# Patient Record
Sex: FEMALE | Race: WHITE | Employment: UNEMPLOYED | URBAN - METROPOLITAN AREA
[De-identification: names, ages, dates, MRNs, and addresses within clinical notes are randomized per-mention and may not be internally consistent; named-entity substitution may affect disease eponyms.]

---
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
## 2020-01-17 ENCOUNTER — OFFICE VISIT (OUTPATIENT)
Dept: ORTHOPEDIC SURGERY | Age: 65
End: 2020-01-17

## 2020-01-17 VITALS
WEIGHT: 179 LBS | BODY MASS INDEX: 35.14 KG/M2 | OXYGEN SATURATION: 97 % | DIASTOLIC BLOOD PRESSURE: 78 MMHG | RESPIRATION RATE: 16 BRPM | HEART RATE: 67 BPM | SYSTOLIC BLOOD PRESSURE: 117 MMHG | HEIGHT: 60 IN

## 2020-01-17 DIAGNOSIS — M54.16 LUMBAR NEURITIS: ICD-10-CM

## 2020-01-17 DIAGNOSIS — M47.816 SPONDYLOSIS WITHOUT MYELOPATHY OR RADICULOPATHY, LUMBAR REGION: Primary | ICD-10-CM

## 2020-01-17 RX ORDER — IBUPROFEN 600 MG/1
TABLET ORAL
COMMUNITY
End: 2021-07-13

## 2020-01-17 RX ORDER — IBUPROFEN 800 MG/1
800 TABLET ORAL
Qty: 45 TAB | Refills: 0 | Status: SHIPPED | OUTPATIENT
Start: 2020-01-17 | End: 2020-07-09

## 2020-01-17 RX ORDER — METHYLPREDNISOLONE 4 MG/1
TABLET ORAL
Qty: 1 DOSE PACK | Refills: 0 | Status: SHIPPED | OUTPATIENT
Start: 2020-01-17 | End: 2021-12-03 | Stop reason: ALTCHOICE

## 2020-01-17 NOTE — LETTER
1/17/20    Patient: Darleen Denny   YOB: 1955   Date of Visit: 1/17/2020     Gloria Berrios NP  [address] [place] [address] [employer] [place]: [phone]       Dear Gloria Berrios NP,      Thank you for referring Ms. Mary Delvalle to [address] for evaluation. My notes for this consultation are attached. If you have questions, please do not hesitate to call me. I look forward to following your patient along with you.       Sincerely,    Serge Leavitt MD
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
## 2020-02-17 ENCOUNTER — OFFICE VISIT (OUTPATIENT)
Dept: ORTHOPEDIC SURGERY | Age: 65
End: 2020-02-17

## 2020-02-17 VITALS
BODY MASS INDEX: 35.22 KG/M2 | HEIGHT: 60 IN | HEART RATE: 71 BPM | SYSTOLIC BLOOD PRESSURE: 127 MMHG | RESPIRATION RATE: 16 BRPM | WEIGHT: 179.4 LBS | TEMPERATURE: 98.1 F | OXYGEN SATURATION: 100 % | DIASTOLIC BLOOD PRESSURE: 72 MMHG

## 2020-02-17 DIAGNOSIS — M54.16 LUMBAR NEURITIS: ICD-10-CM

## 2020-02-17 DIAGNOSIS — E66.01 SEVERE OBESITY (HCC): ICD-10-CM

## 2020-02-17 DIAGNOSIS — M47.816 SPONDYLOSIS WITHOUT MYELOPATHY OR RADICULOPATHY, LUMBAR REGION: Primary | ICD-10-CM

## 2020-02-17 RX ORDER — GABAPENTIN 300 MG/1
300 CAPSULE ORAL
Qty: 90 CAP | Refills: 1 | Status: SHIPPED | OUTPATIENT
Start: 2020-02-17 | End: 2021-12-03 | Stop reason: ALTCHOICE

## 2020-02-17 NOTE — LETTER
2/17/20    Patient: Jj Aguiar   YOB: 1955   Date of Visit: 2/17/2020     Melida Georges NP  [address] [place] [address] [employer] [place]: [phone]       Dear Melida Georges NP,      Thank you for referring Ms. Christian Rapp to [address] for evaluation. My notes for this consultation are attached. If you have questions, please do not hesitate to call me. I look forward to following your patient along with you.       Sincerely,    Bassem Viera MD

## 2020-04-13 ENCOUNTER — VIRTUAL VISIT (OUTPATIENT)
Dept: ORTHOPEDIC SURGERY | Age: 65
End: 2020-04-13

## 2020-04-13 DIAGNOSIS — M47.816 SPONDYLOSIS WITHOUT MYELOPATHY OR RADICULOPATHY, LUMBAR REGION: Primary | ICD-10-CM

## 2020-04-13 DIAGNOSIS — M54.16 LUMBAR NEURITIS: ICD-10-CM

## 2020-04-13 DIAGNOSIS — E66.01 SEVERE OBESITY (HCC): ICD-10-CM
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
## 2020-04-14 RX ORDER — DULOXETIN HYDROCHLORIDE 30 MG/1
30 CAPSULE, DELAYED RELEASE ORAL DAILY
Qty: 30 CAP | Refills: 1 | Status: SHIPPED | OUTPATIENT
Start: 2020-04-14 | End: 2021-12-03 | Stop reason: SDUPTHER

## 2020-05-12 ENCOUNTER — VIRTUAL VISIT (OUTPATIENT)
Dept: ORTHOPEDIC SURGERY | Age: 65
End: 2020-05-12

## 2020-05-12 DIAGNOSIS — E66.01 SEVERE OBESITY (HCC): Primary | ICD-10-CM

## 2020-05-12 DIAGNOSIS — M54.16 LUMBAR NEURITIS: ICD-10-CM

## 2020-05-12 DIAGNOSIS — M47.816 SPONDYLOSIS WITHOUT MYELOPATHY OR RADICULOPATHY, LUMBAR REGION: ICD-10-CM

## 2020-05-12 RX ORDER — DULOXETIN HYDROCHLORIDE 60 MG/1
60 CAPSULE, DELAYED RELEASE ORAL DAILY
Qty: 30 CAP | Refills: 1 | Status: SHIPPED | OUTPATIENT
Start: 2020-05-12 | End: 2022-03-25 | Stop reason: ALTCHOICE
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
## 2020-06-16 ENCOUNTER — OFFICE VISIT (OUTPATIENT)
Dept: ORTHOPEDIC SURGERY | Age: 65
End: 2020-06-16

## 2020-06-16 VITALS
TEMPERATURE: 98.4 F | DIASTOLIC BLOOD PRESSURE: 79 MMHG | OXYGEN SATURATION: 98 % | HEART RATE: 63 BPM | WEIGHT: 176.8 LBS | SYSTOLIC BLOOD PRESSURE: 134 MMHG | BODY MASS INDEX: 34.71 KG/M2 | HEIGHT: 60 IN

## 2020-06-16 DIAGNOSIS — M47.817 LUMBOSACRAL SPONDYLOSIS WITHOUT MYELOPATHY: Primary | ICD-10-CM

## 2020-06-16 DIAGNOSIS — M54.16 LUMBAR NEURITIS: ICD-10-CM

## 2020-06-16 DIAGNOSIS — M47.819 FACET ARTHROPATHY: ICD-10-CM

## 2020-06-16 RX ORDER — PREGABALIN 50 MG/1
50 CAPSULE ORAL 2 TIMES DAILY
Qty: 60 CAP | Refills: 1 | Status: SHIPPED | OUTPATIENT
Start: 2020-06-16 | End: 2021-12-03 | Stop reason: ALTCHOICE

## 2020-06-16 RX ORDER — DULOXETIN HYDROCHLORIDE 60 MG/1
60 CAPSULE, DELAYED RELEASE ORAL DAILY
Qty: 90 CAP | Refills: 1 | Status: SHIPPED | OUTPATIENT
Start: 2020-06-16 | End: 2021-07-13

## 2020-06-16 NOTE — LETTER
6/16/20    Patient: Arely De Oliveira   YOB: 1955   Date of Visit: 6/16/2020     Anthony Agustin NP  [address] [place] [address] Referral.[facility] [place]: [phone]    Dear Anthony Agustin NP,      Thank you for referring Ms. Debra Babin to [address] for evaluation. My notes for this consultation are attached. If you have questions, please do not hesitate to call me. I look forward to following your patient along with you.       Sincerely,    Kyle Inman MD

## 2020-07-09 DIAGNOSIS — M47.816 SPONDYLOSIS WITHOUT MYELOPATHY OR RADICULOPATHY, LUMBAR REGION: ICD-10-CM

## 2020-07-09 DIAGNOSIS — M54.16 LUMBAR NEURITIS: ICD-10-CM

## 2020-07-09 RX ORDER — IBUPROFEN 800 MG/1
TABLET ORAL
Qty: 45 TAB | Refills: 0 | Status: SHIPPED | OUTPATIENT
Start: 2020-07-09 | End: 2021-07-13
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
## 2020-07-22 ENCOUNTER — VIRTUAL VISIT (OUTPATIENT)
Dept: ORTHOPEDIC SURGERY | Age: 65
End: 2020-07-22

## 2020-07-22 DIAGNOSIS — M54.16 LUMBAR NEURITIS: ICD-10-CM

## 2020-07-22 DIAGNOSIS — M47.819 FACET ARTHROPATHY: ICD-10-CM

## 2020-07-22 DIAGNOSIS — M47.817 LUMBOSACRAL SPONDYLOSIS WITHOUT MYELOPATHY: Primary | ICD-10-CM

## 2020-07-22 RX ORDER — DULOXETIN HYDROCHLORIDE 30 MG/1
30 CAPSULE, DELAYED RELEASE ORAL DAILY
Qty: 30 CAP | Refills: 1 | Status: SHIPPED | OUTPATIENT
Start: 2020-07-22 | End: 2021-07-13

## 2020-07-22 RX ORDER — IBUPROFEN 800 MG/1
800 TABLET ORAL
COMMUNITY
End: 2021-12-03 | Stop reason: ALTCHOICE

## 2020-07-22 RX ORDER — PENICILLIN V POTASSIUM 500 MG/1
TABLET, FILM COATED ORAL
COMMUNITY
Start: 2020-07-09 | End: 2021-12-03 | Stop reason: ALTCHOICE

## 2020-09-04 ENCOUNTER — OFFICE VISIT (OUTPATIENT)
Dept: NEUROLOGY | Age: 65
End: 2020-09-04

## 2020-09-04 VITALS
DIASTOLIC BLOOD PRESSURE: 82 MMHG | WEIGHT: 174.4 LBS | BODY MASS INDEX: 34.24 KG/M2 | SYSTOLIC BLOOD PRESSURE: 136 MMHG | RESPIRATION RATE: 18 BRPM | HEART RATE: 68 BPM | OXYGEN SATURATION: 98 % | HEIGHT: 60 IN

## 2020-09-04 DIAGNOSIS — G50.0 RIGHT TRIGEMINAL NEURALGIA: Primary | ICD-10-CM

## 2020-09-04 RX ORDER — CEPHALEXIN 500 MG/1
CAPSULE ORAL
COMMUNITY
Start: 2020-08-19 | End: 2021-07-13

## 2020-09-04 RX ORDER — CARBAMAZEPINE 100 MG/1
100 TABLET, EXTENDED RELEASE ORAL 2 TIMES DAILY
Qty: 60 TAB | Refills: 2 | Status: SHIPPED | OUTPATIENT
Start: 2020-09-04 | End: 2020-10-04

## 2020-09-04 NOTE — LETTER
9/4/20 Patient: Talia Chu YOB: 1955 Date of Visit: 9/4/2020 Blanca Molina NP 
[address] [place] [address] VIA Facsimile: [phone] Dear Blanca Molina NP, Thank you for referring Ms. Chelsea Hameed to [facility] for evaluation. My notes for this consultation are attached. If you have questions, please do not hesitate to call me. I look forward to following your patient along with you. Sincerely, Thai Moreland MD

## 2020-09-04 NOTE — PATIENT INSTRUCTIONS
Carbamazepine (By mouth) Carbamazepine (Aria Nate) Treats seizures, nerve pain, or bipolar disorder. Brand Name(s): Carbatrol, Epitol, Equetro, TEGretol, TEGretol-XR There may be other brand names for this medicine. When This Medicine Should Not Be Used: This medicine is not right for everyone. Do not use it if you had an allergic reaction to carbamazepine or a tricyclic antidepressant or if you are pregnant. How to Use This Medicine:  
Long Acting Capsule, Liquid, Tablet, Chewable Tablet, Long Acting Tablet · Take your medicine as directed. Your dose may need to be changed several times to find what works best for you. This medicine may be used alone or together with other seizure medicines. · Extended-release capsule: ¨ Swallow the capsule whole. Do not break, crush, or chew it. ¨ You may open the capsule and pour the medicine into a small amount of soft food such as pudding, yogurt, or applesauce. Stir this mixture well and swallow it without chewing. · Chewable tablet:  
¨ It is best to take this medicine with food or milk. ¨ Chew the tablet well before swallowing it. · Liquid:  
¨ It is best to take this medicine with food or milk. ¨ Measure the oral liquid medicine with a marked measuring spoon, oral syringe, or medicine cup. Shake the bottle well just before each use. ¨ Do not take this medicine at the same time as other oral liquid medicines. · Tablet:  
¨ It is best to take this medicine with food or milk. ¨ Swallow the tablet whole. Do not crush, break, or chew it. ¨ Do not use an extended-release tablet that is cracked or chipped. · This medicine should come with a Medication Guide. Ask your pharmacist for a copy if you do not have one. · Missed dose: Take a dose as soon as you remember. If it is almost time for your next dose, wait until then and take a regular dose. Do not take extra medicine to make up for a missed dose. · Store the medicine in a closed container at room temperature, away from heat, moisture, and direct light. Drugs and Foods to Avoid: Ask your doctor or pharmacist before using any other medicine, including over-the-counter medicines, vitamins, and herbal products. · Do not use this medicine together with nefazodone, delavirdine, or certain other medicines for HIV or AIDS (including efavirenz, etravirine). Do not use this medicine and an MAO inhibitor (MAOI) within 14 days of each other. · The list below includes some of the most commonly used medicines that can interact with carbamazepine. There are many other drugs not listed. Make sure your doctor knows the names of all the medicines you use. Tell your doctor if you are using any of the following: ¨ Alprazolam, aprepitant, aripiprazole, buspirone, chloroquine, citalopram, clarithromycin, clomipramine, clonazepam, clozapine, cyclophosphamide, cyclosporine, diltiazem, doxorubicin, erythromycin, everolimus, felodipine, fluoxetine, imatinib, isoniazid, lamotrigine, lapatinib, lithium, loxapine, mefloquine, methadone, phenytoin, praziquantel, primidone, propoxyphene, quetiapine, quinine, sirolimus, tacrolimus, temsirolimus, tramadol, trazodone, triazolam, valproate, verapamil, or zileuton ¨ HIV protease inhibitor (including atazanavir, darunavir, fosamprenavir, indinavir, lopinavir, ritonavir, saquinavir) ¨ Medicine to treat fungal infection (including fluconazole, itraconazole, ketoconazole, voriconazole) ¨ Steroid (including dexamethasone, prednisolone, prednisone) · Birth control pills, shots, and other hormonal birth control methods may not work as well while you use this medicine. You may want to use a second form of birth control. · Do not eat grapefruit or drink grapefruit juice while you are using this medicine. · Tell your doctor if you use anything else that makes you sleepy.  Some examples are allergy medicine, narcotic pain medicine, and alcohol. Warnings While Using This Medicine: · It is not safe to take this medicine during pregnancy. It could harm an unborn baby. Tell your doctor right away if you become pregnant. · Tell your doctor if you are breastfeeding, or if you have kidney disease, liver disease, glaucoma, heart disease, heart rhythm problems, porphyria, an intolerance to fructose, or a history of bone marrow depression, suicidal thoughts, or depression. Tell your doctor if you had an allergic reaction to any other medicine (especially seizure medicines). · Tell your doctor if you have  ancestry. Your doctor may test you for serious skin reactions before prescribing this medicine. · This medicine may cause the following problems: 
¨ Serious skin reactions ¨ Aplastic anemia or other blood problems ¨ Drug reaction with eosinophilia and systemic symptoms (DRESS), which may damage organs such as the liver, kidney, or heart ¨ Suicidal thoughts or behavior ¨ Low sodium levels in the blood ¨ Liver damage · Do not stop using this medicine suddenly. Your doctor will need to slowly decrease your dose before you stop it completely. · Tell any doctor or dentist who treats you that you are using this medicine. This medicine may affect certain medical test results. · This medicine may make you dizzy or drowsy. Do not drive or do anything else that could be dangerous until you know how this medicine affects you. · Your doctor will do lab tests at regular visits to check on the effects of this medicine. Keep all appointments. Your doctor may want to have your eyes checked by an eye doctor. · Keep all medicine out of the reach of children. Never share your medicine with anyone. Possible Side Effects While Using This Medicine:  
Call your doctor right away if you notice any of these side effects: · Allergic reaction: Itching or hives, swelling in your face or hands, swelling or tingling in your mouth or throat, chest tightness, trouble breathing · Blistering, peeling, red skin rash · Blurred vision, changes in vision · Change in how much or how often you urinate · Chest pain, trouble breathing, cold sweats, bluish skin · Confusion, memory problems, unusual tiredness, muscle spasms or weakness · Dark urine or pale stools, nausea, vomiting, loss of appetite, stomach pain, yellow skin or eyes · Fast, slow, pounding, or uneven heartbeat · Fever, chills, cough, sore throat, or sores in your mouth · Lightheadedness or fainting · Swollen, painful, or tender lymph glands in your neck, armpit, or groin · Thoughts of hurting yourself or others, unusual thoughts or behavior · Unusual bleeding, bruising, or weakness If you notice these less serious side effects, talk with your doctor: · Anxiety, agitation, depression, restlessness, or trouble sleeping · Dizziness or drowsiness · Dry mouth · Mild nausea, vomiting, constipation If you notice other side effects that you think are caused by this medicine, tell your doctor. Call your doctor for medical advice about side effects. You may report side effects to FDA at 3-068-IDG-3445 © 2017 [facility] Information is for End User's use only and may not be sold, redistributed or otherwise used for commercial purposes. The above information is an  only. It is not intended as medical advice for individual conditions or treatments. Talk to your doctor, nurse or pharmacist before following any medical regimen to see if it is safe and effective for you. Trigeminal Neuralgia: Care Instructions Your Care Instructions Trigeminal neuralgia is a problem with the large nerve that brings feeling to your face. It causes a sudden, sharp pain on one side of your face.  Just touching your cheek or talking can set off shooting pain toward the ear, eye, or nostril. Living with this pain can be very hard. Some people have long periods when they do not have pain, and then it comes back. Some people have periods of pain often. But medicine or other treatment often can make the pain go away. If you keep having pain, surgery may help. This problem is also called tic douloureux (say \"daxa cadenao-casey-GINA\"). Follow-up care is a key part of your treatment and safety. Be sure to make and go to all appointments, and call your doctor if you are having problems. It's also a good idea to know your test results and keep a list of the medicines you take. How can you care for yourself at home? · Write down when you have pain and what you were doing when it started. Try to find what causes the pain. Being in a cold wind, yawning, or shaving are examples. Avoid or limit these triggers if you can. · Be safe with medicines. Take your medicines exactly as prescribed. ? Your doctor may have prescribed medicines used to treat depression and seizures. They can reduce your pain, help you sleep better, and improve your mood. ? Call your doctor if you think you are having a problem with your medicine. You will get more details on the specific medicines your doctor prescribes. ? If you are not taking a prescription pain medicine, take an over-the-counter medicine such as acetaminophen (Tylenol), ibuprofen (Advil, Motrin), or naproxen (Aleve). Read and follow all instructions on the label. ? Do not take two or more pain medicines at the same time unless the doctor told you to. Many pain medicines have acetaminophen, which is Tylenol. Too much acetaminophen (Tylenol) can be harmful. · Reduce stress in your life. Ask your doctor about ways to relax. These may include breathing exercises and massage. · Think about joining a support group with other people who have this problem.  These groups can give comfort and information about what to do to feel better. Your doctor can tell you how to find a support group. When should you call for help? Call your doctor now or seek immediate medical care if: 
  · You have severe pain that you can't control. Watch closely for changes in your health, and be sure to contact your doctor if: 
  · You are not able to sleep because of the pain.  
  · You do not get better as expected. Where can you learn more? Go to http://tyler-socorro.info/ Enter R378 in the search box to learn more about \"Trigeminal Neuralgia: Care Instructions. \" Current as of: June 26, 2019               Content Version: 12.6 © 6651-6612 [employer]. Care instructions adapted under license by [employer] (which disclaims liability or warranty for this information). If you have questions about a medical condition or this instruction, always ask your healthcare professional. [address] any warranty or liability for your use of this information.

## 2020-09-04 NOTE — PROGRESS NOTES
Paulette Sacks is a 76 y.o. female . [facility] and Facial Pain   . A 76years old female patient with medical history of chronic low back pain here for evaluation of right lower facial pain of about 2 months duration. She describes the pain as intermittent sharp shooting pain lasting for about 5 minutes. It is triggered by touching the lower part of the chest, brushing her teeth, eating or drinking. Pain is severe. She was seen by her dentist and had x-rays and her exam was normal.  She was given antibiotics. No change in the intensity of the pain. She takes over-the-counter pain medications with no relief. She has attacks almost every day. She does not have any other site of similar pain. No facial numbness or droop. No rash. No change in her speech. No numbness over her arms or legs. No recent trauma      Review of Systems   Constitutional: Negative for chills, fever, malaise/fatigue and weight loss. HENT: Positive for tinnitus. Negative for hearing loss. Eyes: Negative for blurred vision and double vision. Respiratory: Negative for cough and shortness of breath. Cardiovascular: Negative for chest pain and leg swelling. Gastrointestinal: Negative for heartburn, nausea and vomiting. Genitourinary: Negative for dysuria, frequency and urgency. Musculoskeletal: Positive for back pain (radiates to the LEs). Negative for myalgias and neck pain. Skin: Negative for itching and rash. Neurological: Negative for dizziness, tingling, tremors, sensory change, speech change, weakness and headaches. Endo/Heme/Allergies: Does not bruise/bleed easily. Psychiatric/Behavioral: Negative for depression. The patient does not have insomnia. No past medical history on file. Past Surgical History:   Procedure Laterality Date    HX CYST REMOVAL      cyst removal on breast    HX HYSTERECTOMY      HX TONSILLECTOMY          No family history on file. Social History     Socioeconomic History    Marital status: SINGLE     Spouse name: Not on file    Number of children: Not on file    Years of education: Not on file    Highest education level: Not on file   Occupational History    Not on file   Social Needs    Financial resource strain: Not on file    Food insecurity     Worry: Not on file     Inability: Not on file    Transportation needs     Medical: Not on file     Non-medical: Not on file   Tobacco Use    Smoking status: Never Smoker    Smokeless tobacco: Never Used   Substance and Sexual Activity    Alcohol use: Not Currently    Drug use: Not on file    Sexual activity: Not on file   Lifestyle    Physical activity     Days per week: Not on file     Minutes per session: Not on file    Stress: Not on file   Relationships    Social connections     Talks on phone: Not on file     Gets together: Not on file     Attends Orthodoxy service: Not on file     Active member of club or organization: Not on file     Attends meetings of clubs or organizations: Not on file     Relationship status: Not on file    Intimate partner violence     Fear of current or ex partner: Not on file     Emotionally abused: Not on file     Physically abused: Not on file     Forced sexual activity: Not on file   Other Topics Concern    Not on file   Social History Narrative    Not on file        No Known Allergies      Current Outpatient Medications   Medication Sig Dispense Refill    DULoxetine (CYMBALTA) 30 mg capsule Take 1 Cap by mouth daily. Indications: neuropathic pain 30 Cap 1    cephALEXin (KEFLEX) 500 mg capsule TAKE 1 CAPSULE BY MOUTH THREE TIMES DAILY      penicillin v potassium (VEETID) 500 mg tablet TAKE 1 TABLET BY MOUTH 4 TIMES DAILY FOR INFECTION      ibuprofen (MOTRIN) 800 mg tablet Take 800 mg by mouth.  DULoxetine (CYMBALTA) 30 mg capsule Take 1 Cap by mouth daily.  30 Cap 1    ibuprofen (MOTRIN) 800 mg tablet TAKE 1 TABLET BY MOUTH THREE TIMES DAILY WITH MEALS 45 Tab 0    DULoxetine (CYMBALTA) 60 mg capsule Take 1 Cap by mouth daily. 90 Cap 1    pregabalin (LYRICA) 50 mg capsule Take 1 Cap by mouth two (2) times a day. Max Daily Amount: 100 mg. 60 Cap 1    DULoxetine (CYMBALTA) 60 mg capsule Take 1 Cap by mouth daily. 30 Cap 1    gabapentin (NEURONTIN) 300 mg capsule Take 1 Cap by mouth three (3) times daily (with meals). Max Daily Amount: 900 mg. Indications: neuropathic pain 90 Cap 1    ibuprofen (MOTRIN) 600 mg tablet Take  by mouth every six (6) hours as needed for Pain.  methylPREDNISolone (MEDROL DOSEPACK) 4 mg tablet Per dose pack instructions 1 Dose Pack 0         Physical Exam  Constitutional:       Appearance: Normal appearance. HENT:      Head: Normocephalic and atraumatic. Right Ear: Tympanic membrane normal.      Left Ear: Tympanic membrane normal.      Mouth/Throat:      Mouth: Mucous membranes are moist.      Pharynx: Oropharynx is clear. No oropharyngeal exudate. Eyes:      Extraocular Movements: Extraocular movements intact. Pupils: Pupils are equal, round, and reactive to light. Neck:      Musculoskeletal: Normal range of motion and neck supple. Pulmonary:      Effort: Pulmonary effort is normal. No respiratory distress. Musculoskeletal: Normal range of motion. Right lower leg: No edema. Left lower leg: No edema. Neurological:      Mental Status: She is alert. Comments: Mental status: Awake, alert, oriented x3, follows simple and complex commands. Speech and languge: fluent, coherent, naming and repitition intact, reading and comprehension intact  CN: VFF, EOMI, PERRLA, face sensation intact , no facial asymmetry noted, palate elevation symmetric bilat, SS+SCM 5/5 bilat, tongue midline  Motor: no pronator drift, tone normal throughout, strength 5/5 throughout  Sensory: intact to light touch throughout  Coordination: FNF, HS accurate w/o dysmetria  DTR: 2+ throughout.   Gait: normal. No visits with results within 3 Month(s) from this visit. Latest known visit with results is:   No results found for any previous visit. ICD-10-CM ICD-9-CM    1. Right trigeminal neuralgia  G50.0 350.1 MRI BRAIN W WO CONT      MRA BRAIN WO CONT      carBAMazepine XR (TEGretol XR) 100 mg SR tablet     A 71years old female patient here for evaluation of sharp shooting pain over the lower right face; mainly involving the mandibular division of the trigeminal nerve. No swelling. No discharge from her gum. Dental evaluation is normal and currently takes antibiotic. No obvious neuro findings. The pain is most likely from trigeminal neuralgia. Will get MRI of her brain with and without contrast rule out by possible structural cause. We will also get MRA of the head to look for vascular loop. We will start her on carbamazepine 100 mg p.o. twice daily; will gradually increase the dose. Discussed side effects of other medication. We will see her again in 3 months time but will call her with results of the MRI.

## 2020-09-04 NOTE — PROGRESS NOTES
Phani Mcarthur is a 76 y.o. female new patient in office today to discuss facial pain; as referred by Mikel Coyle NP.  Patient has c/o

## 2020-09-11 ENCOUNTER — TELEPHONE (OUTPATIENT)
Dept: NEUROLOGY | Age: 65
End: 2020-09-11

## 2020-09-11 NOTE — TELEPHONE ENCOUNTER
Pt states that Carbamazepine is not working and requests a new medication for the pain. Please advise.

## 2020-09-23 ENCOUNTER — HOSPITAL ENCOUNTER (OUTPATIENT)
Age: 65
Discharge: HOME | End: 2020-09-23
Attending: STUDENT IN AN ORGANIZED HEALTH CARE EDUCATION/TRAINING PROGRAM
Payer: MEDICARE

## 2020-09-23 DIAGNOSIS — G50.0 RIGHT TRIGEMINAL NEURALGIA: ICD-10-CM

## 2020-09-23 LAB — CREAT UR-MCNC: 0.7 MG/DL (ref 0.6–1.3)

## 2020-09-23 PROCEDURE — 70544 MR ANGIOGRAPHY HEAD W/O DYE: CPT

## 2020-09-23 PROCEDURE — A9575 INJ GADOTERATE MEGLUMI 0.1ML: HCPCS | Performed by: STUDENT IN AN ORGANIZED HEALTH CARE EDUCATION/TRAINING PROGRAM

## 2020-09-23 PROCEDURE — 82565 ASSAY OF CREATININE: CPT

## 2020-09-23 PROCEDURE — 70553 MRI BRAIN STEM W/O & W/DYE: CPT

## 2020-09-23 PROCEDURE — 74011636320 HC RX REV CODE- 636/320: Performed by: STUDENT IN AN ORGANIZED HEALTH CARE EDUCATION/TRAINING PROGRAM

## 2020-09-23 RX ADMIN — GADOTERATE MEGLUMINE 17 ML: 376.9 INJECTION INTRAVENOUS at 15:00

## 2020-09-26 DIAGNOSIS — G50.0 RIGHT TRIGEMINAL NEURALGIA: Primary | ICD-10-CM

## 2020-09-26 NOTE — PROGRESS NOTES
Please let her know that the MRA of the head has shown the trigeminal nerve is in contact with a lopping artery which can cause the trigeminal neuralgia. The brain otherwise looks normal.  I have put referral to see neurosurgery for possible microvascular decompressive surgery.

## 2020-09-28 NOTE — PROGRESS NOTES
Spoke with patient, made aware of provider's comments, recommendations and referral to neurosurgery.

## 2020-10-07 ENCOUNTER — TELEPHONE (OUTPATIENT)
Dept: NEUROLOGY | Age: 65
End: 2020-10-07

## 2020-10-07 NOTE — TELEPHONE ENCOUNTER
Pt states that medication she was given for nerve pain is not helping. She requests stronger medication. Please advise.

## 2020-12-04 ENCOUNTER — VIRTUAL VISIT (OUTPATIENT)
Dept: NEUROLOGY | Age: 65
End: 2020-12-04
Payer: COMMERCIAL

## 2020-12-04 DIAGNOSIS — G50.0 RIGHT TRIGEMINAL NEURALGIA: Primary | ICD-10-CM

## 2020-12-04 PROCEDURE — 99214 OFFICE O/P EST MOD 30 MIN: CPT | Performed by: STUDENT IN AN ORGANIZED HEALTH CARE EDUCATION/TRAINING PROGRAM

## 2020-12-04 RX ORDER — CARBAMAZEPINE 200 MG/1
200 TABLET ORAL 3 TIMES DAILY
Qty: 90 TAB | Refills: 3 | Status: SHIPPED | OUTPATIENT
Start: 2020-12-04 | End: 2021-01-03

## 2020-12-04 NOTE — PROGRESS NOTES
Joana Flores is a 76 y.o. female on virtual visit today for follow-up on right trigeminal neuralgia. 1. Have you been to the ER, urgent care clinic since your last visit? Hospitalized since your last visit? No    2. Have you seen or consulted any other health care providers outside of the [address] [place] [address] since your last visit? Include any pap smears or colon screening. No    Mobile number [phone] will be used for today's visit.

## 2020-12-04 NOTE — PROGRESS NOTES
Lenora Quiros is a 76 y.o. female who was seen by synchronous (real-time) audio-video technology on 12/4/2020 for Facial Pain (right trigeminal neuralgia)        Assessment & Plan:   Diagnoses and all orders for this visit:    1. Right trigeminal neuralgia  -     carBAMazepine (TEGretol) 200 mg tablet; Take 1 Tab by mouth three (3) times daily for 30 days. A 76years old female patient here for follow-up of trigeminal neuralgia. Patient continues to have severe pain [possible questionable compliance to her medication and asking for more stronger pain medications; claims she does not want to take any medication which has side effects like sedation]. Since the pain is getting worse despite the 200 mg twice daily carbamazepine, I will increase the dose to 200 mg p.o. 3 times daily. She will follow with neurosurgery for the microvascular decompression. Will check hercarbamazepine level,  CBC and BMP. Will see her in 3 months. Subjective:   A 58-year-old female patient here for follow-up of right trigeminal neuralgia mainly in the maxillary and mandibular divisions. This is a virtual/video encounter. Her last visit was in September 2020. She was started on carbamazepine 100 mg p.o. twice daily. Subsequently the dose of gabapentin was increased to 200 mg p.o. twice daily. In addition was also started on gabapentin and claims she is taking 300 mg twice a day. Patient is complaining of severe pain and cramps none of her medications are working. During the last visit, MRI and MRA of the brain were ordered. 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 She was referred to [facility] neurosurgery []. Repeat MRI of the brain and internal auditory canal was done on November 30, 2020.   It showed contact of the  right trigeminal nerve by a descending vascular loop, the origin of which is not well established; the IAC appears normal.  Trying to schedule her for surgery [microvascular decompression]. Prior to Admission medications    Medication Sig Start Date End Date Taking? Authorizing Provider   carBAMazepine (TEGretol) 200 mg tablet Take 1 Tab by mouth three (3) times daily for 30 days. 12/4/20 1/3/21 Yes Evelyn PATINO MD   ibuprofen (MOTRIN) 800 mg tablet TAKE 1 TABLET BY MOUTH THREE TIMES DAILY WITH MEALS 7/9/20  Yes Karen Bell NP   gabapentin (NEURONTIN) 300 mg capsule Take 1 Cap by mouth three (3) times daily (with meals). Max Daily Amount: 900 mg. Indications: neuropathic pain 2/17/20  Yes Dylon Dinh MD   ibuprofen (MOTRIN) 600 mg tablet Take  by mouth every six (6) hours as needed for Pain. Yes Provider, Historical   cephALEXin (KEFLEX) 500 mg capsule TAKE 1 CAPSULE BY MOUTH THREE TIMES DAILY 8/19/20   Provider, Historical   penicillin v potassium (VEETID) 500 mg tablet TAKE 1 TABLET BY MOUTH 4 TIMES DAILY FOR INFECTION 7/9/20   Provider, Historical   ibuprofen (MOTRIN) 800 mg tablet Take 800 mg by mouth. Provider, Historical   DULoxetine (CYMBALTA) 30 mg capsule Take 1 Cap by mouth daily. 7/22/20   Ankur Wallace MD   DULoxetine (CYMBALTA) 60 mg capsule Take 1 Cap by mouth daily. 6/16/20   Dylon Dinh MD   pregabalin (LYRICA) 50 mg capsule Take 1 Cap by mouth two (2) times a day. Max Daily Amount: 100 mg. 6/16/20   Ankur Wallace MD   DULoxetine (CYMBALTA) 60 mg capsule Take 1 Cap by mouth daily. 5/12/20   Ankur Wallace MD   DULoxetine (CYMBALTA) 30 mg capsule Take 1 Cap by mouth daily.  Indications: neuropathic pain 4/14/20   Dylon Dinh MD   methylPREDNISolone (MEDROL DOSEPACK) 4 mg tablet Per dose pack instructions 1/17/20   Dylon Dinh MD     Patient Active Problem List   Diagnosis Code    Severe obesity ([facility] E66.01     Patient Active Problem List    Diagnosis Date Noted    Severe obesity ([facility] 02/17/2020     Current Outpatient Medications   Medication Sig Dispense Refill    carBAMazepine (TEGretol) 200 mg tablet Take 1 Tab by mouth three (3) times daily for 30 days. 90 Tab 3    ibuprofen (MOTRIN) 800 mg tablet TAKE 1 TABLET BY MOUTH THREE TIMES DAILY WITH MEALS 45 Tab 0    gabapentin (NEURONTIN) 300 mg capsule Take 1 Cap by mouth three (3) times daily (with meals). Max Daily Amount: 900 mg. Indications: neuropathic pain 90 Cap 1    ibuprofen (MOTRIN) 600 mg tablet Take  by mouth every six (6) hours as needed for Pain.  cephALEXin (KEFLEX) 500 mg capsule TAKE 1 CAPSULE BY MOUTH THREE TIMES DAILY      penicillin v potassium (VEETID) 500 mg tablet TAKE 1 TABLET BY MOUTH 4 TIMES DAILY FOR INFECTION      ibuprofen (MOTRIN) 800 mg tablet Take 800 mg by mouth.  DULoxetine (CYMBALTA) 30 mg capsule Take 1 Cap by mouth daily. 30 Cap 1    DULoxetine (CYMBALTA) 60 mg capsule Take 1 Cap by mouth daily. 90 Cap 1    pregabalin (LYRICA) 50 mg capsule Take 1 Cap by mouth two (2) times a day. Max Daily Amount: 100 mg. 60 Cap 1    DULoxetine (CYMBALTA) 60 mg capsule Take 1 Cap by mouth daily. 30 Cap 1    DULoxetine (CYMBALTA) 30 mg capsule Take 1 Cap by mouth daily. Indications: neuropathic pain 30 Cap 1    methylPREDNISolone (MEDROL DOSEPACK) 4 mg tablet Per dose pack instructions 1 Dose Pack 0     No Known Allergies  No past medical history on file. Past Surgical History:   Procedure Laterality Date    HX CYST REMOVAL      cyst removal on breast    HX HYSTERECTOMY      HX TONSILLECTOMY       No family history on file. Social History     Tobacco Use    Smoking status: Never Smoker    Smokeless tobacco: Never Used   Substance Use Topics    Alcohol use: Not Currently           Objective:   No flowsheet data found.      [INSTRUCTIONS:  \"[x]\" Indicates a positive item  \"[]\" Indicates a negative item  -- DELETE ALL ITEMS NOT EXAMINED]    Constitutional: [x] Appears well-developed and well-nourished [x] No apparent distress      [] Abnormal -     Mental status: [x] Alert and awake  [x] Oriented to person/place/time [x] Able to follow commands    [] Abnormal -     Eyes:   EOM    [x]  Normal    [] Abnormal -   Sclera  []  Normal    [] Abnormal -          Discharge []  None visible   [] Abnormal -     HENT: [x] Normocephalic, atraumatic  [] Abnormal -   [x] Mouth/Throat: Mucous membranes are moist    External Ears [] Normal  [] Abnormal -    Neck: [x] No visualized mass [] Abnormal -     Pulmonary/Chest: [] Respiratory effort normal   [x] No visualized signs of difficulty breathing or respiratory distress        [] Abnormal -      Musculoskeletal:   [x] Normal gait with no signs of ataxia         [x] Normal range of motion of neck        [] Abnormal -     Neurological:        [x] No Facial Asymmetry (Cranial nerve 7 motor function) (limited exam due to video visit)          [x] No gaze palsy        [] Abnormal -          Skin:        [] No significant exanthematous lesions or discoloration noted on facial skin         [] Abnormal -            Psychiatric:       [x] Normal Affect [] Abnormal -        [] No Hallucinations    Other pertinent observable physical exam findings:-        We discussed the expected course, resolution and complications of the diagnosis(es) in detail. Medication risks, benefits, costs, interactions, and alternatives were discussed as indicated. I advised her to contact the office if her condition worsens, changes or fails to improve as anticipated. She expressed understanding with the diagnosis(es) and plan. Lito Arnold, who was evaluated through a patient-initiated, synchronous (real-time) audio-video encounter, and/or her healthcare decision maker, is aware that it is a billable service, with coverage as determined by her insurance carrier. She provided verbal consent to proceed: Yes, and patient identification was verified.  It was conducted pursuant to the emergency declaration under the [address] [facility] waiver authority and the [employer] Act. A caregiver was present when appropriate. Ability to conduct physical exam was limited. I was in the office. The patient was work place.       Jordy Hernandez MD

## 2021-03-18 ENCOUNTER — VIRTUAL VISIT (OUTPATIENT)
Dept: NEUROLOGY | Age: 66
End: 2021-03-18
Payer: COMMERCIAL

## 2021-03-18 DIAGNOSIS — G50.0 RIGHT TRIGEMINAL NEURALGIA: Primary | ICD-10-CM

## 2021-03-18 PROCEDURE — 99213 OFFICE O/P EST LOW 20 MIN: CPT | Performed by: STUDENT IN AN ORGANIZED HEALTH CARE EDUCATION/TRAINING PROGRAM

## 2021-03-18 NOTE — PROGRESS NOTES
Elissa Gordon is a 66 y.o. female who was seen by synchronous (real-time) audio-video technology on 3/18/2021 for Follow-up (Right trigeminal neuralgia )        Assessment & Plan:   Diagnoses and all orders for this visit:    1. Right trigeminal neuralgia    A 65 years old female patient here for follow-up of trigeminal neuralgia.  Was on carbamazepine during the last visit 200 mg p.o. 3 times daily.  She did not have any pain for the past 2 months.  Not currently taking the carbamazepine.  Following with neurosurgery for possible microvascular decompression surgery.  Since he does not have any pain, will continue observing her off medication.  We will see her in the clinic in 6 months time.      Subjective:   A 65-year-old female patient here for follow-up of right trigeminal neuralgia.  She  was last seen over a virtual encounter on December 4, 2020.  Was on carbamazepine 200 mg p.o. 3 times daily.  She claims, the facial pain subsided completely and did not have any pain for the past 2 months.  She has stopped taking the carbamazepine.  Is following with neurosurgery at [facility].  Denied any difficulty chewing.      Prior to Admission medications    Medication Sig Start Date End Date Taking? Authorizing Provider   cephALEXin (KEFLEX) 500 mg capsule TAKE 1 CAPSULE BY MOUTH THREE TIMES DAILY 8/19/20  Yes Provider, Historical   ibuprofen (MOTRIN) 800 mg tablet Take 800 mg by mouth.   Yes Provider, Historical   DULoxetine (CYMBALTA) 30 mg capsule Take 1 Cap by mouth daily. 7/22/20  Yes Ankur Wallace MD   ibuprofen (MOTRIN) 800 mg tablet TAKE 1 TABLET BY MOUTH THREE TIMES DAILY WITH MEALS 7/9/20  Yes Karen Bell NP   DULoxetine (CYMBALTA) 60 mg capsule Take 1 Cap by mouth daily. 6/16/20  Yes Ankur Wallace MD   pregabalin (LYRICA) 50 mg capsule Take 1 Cap by mouth two (2) times a day. Max Daily Amount: 100 mg. 6/16/20  Yes Ankur Wallace MD   DULoxetine (CYMBALTA) 60 mg capsule Take 1 Cap  by mouth daily. 5/12/20  Yes Logan Seymour MD   DULoxetine (CYMBALTA) 30 mg capsule Take 1 Cap by mouth daily. Indications: neuropathic pain 4/14/20  Yes Logan Seymour MD   gabapentin (NEURONTIN) 300 mg capsule Take 1 Cap by mouth three (3) times daily (with meals). Max Daily Amount: 900 mg. Indications: neuropathic pain 2/17/20  Yes Logan Seymour MD   ibuprofen (MOTRIN) 600 mg tablet Take  by mouth every six (6) hours as needed for Pain. Yes Provider, Historical   methylPREDNISolone (MEDROL DOSEPACK) 4 mg tablet Per dose pack instructions 1/17/20  Yes Logan Seymour MD   penicillin v potassium (VEETID) 500 mg tablet TAKE 1 TABLET BY MOUTH 4 TIMES DAILY FOR INFECTION 7/9/20   Provider, Historical     Patient Active Problem List   Diagnosis Code    [facility]     Patient Active Problem List    Diagnosis Date Noted    Severe obesity ([facility] 02/17/2020     Current Outpatient Medications   Medication Sig Dispense Refill    cephALEXin (KEFLEX) 500 mg capsule TAKE 1 CAPSULE BY MOUTH THREE TIMES DAILY      ibuprofen (MOTRIN) 800 mg tablet Take 800 mg by mouth.  DULoxetine (CYMBALTA) 30 mg capsule Take 1 Cap by mouth daily. 30 Cap 1    ibuprofen (MOTRIN) 800 mg tablet TAKE 1 TABLET BY MOUTH THREE TIMES DAILY WITH MEALS 45 Tab 0    DULoxetine (CYMBALTA) 60 mg capsule Take 1 Cap by mouth daily. 90 Cap 1    pregabalin (LYRICA) 50 mg capsule Take 1 Cap by mouth two (2) times a day. Max Daily Amount: 100 mg. 60 Cap 1    DULoxetine (CYMBALTA) 60 mg capsule Take 1 Cap by mouth daily. 30 Cap 1    DULoxetine (CYMBALTA) 30 mg capsule Take 1 Cap by mouth daily. Indications: neuropathic pain 30 Cap 1    gabapentin (NEURONTIN) 300 mg capsule Take 1 Cap by mouth three (3) times daily (with meals). Max Daily Amount: 900 mg. Indications: neuropathic pain 90 Cap 1    ibuprofen (MOTRIN) 600 mg tablet Take  by mouth every six (6) hours as needed for Pain.       methylPREDNISolone (MEDROL DOSEPACK) 4 mg tablet Per dose pack instructions 1 Dose Pack 0    penicillin v potassium (VEETID) 500 mg tablet TAKE 1 TABLET BY MOUTH 4 TIMES DAILY FOR INFECTION       No Known Allergies  No past medical history on file. Past Surgical History:   Procedure Laterality Date    HX CYST REMOVAL      cyst removal on breast    HX HYSTERECTOMY      HX TONSILLECTOMY       History reviewed. No pertinent family history. Social History     Tobacco Use    Smoking status: Never Smoker    Smokeless tobacco: Never Used   Substance Use Topics    Alcohol use: Not Currently           Objective:     Patient-Reported Vitals 3/18/2021   Patient-Reported Weight 175.0   Patient-Reported Systolic  096   Patient-Reported Diastolic 75        [INSTRUCTIONS:  \"[x]\" Indicates a positive item  \"[]\" Indicates a negative item  -- DELETE ALL ITEMS NOT EXAMINED]    Constitutional: [] Appears well-developed and well-nourished [x] No apparent distress      [] Abnormal -     Mental status: [x] Alert and awake  [x] Oriented     [x] Able to follow commands    [] Abnormal -     Eyes:   EOM    [x]  Normal    [] Abnormal -   Sclera  []  Normal    [] Abnormal -          Discharge []  None visible   [] Abnormal -     HENT: [x] Normocephalic, atraumatic  [] Abnormal -   [x] Mouth/Throat: Mucous membranes are moist    External Ears [] Normal  [] Abnormal -    Neck: [] No visualized mass [] Abnormal -     Pulmonary/Chest: [x] Respiratory effort normal   [x] No visualized signs of difficulty breathing or respiratory distress        [] Abnormal -      Musculoskeletal:   [] Normal gait with no signs of ataxia         [] Normal range of motion of neck        [] Abnormal -     Neurological:        [x] No Facial Asymmetry (Cranial nerve 7 motor function)         [x] No gaze palsy        [] Abnormal -      Mental status: Awake, alert, oriented, follows simple and complex commands.   Speech and languge: fluent, coherent,  and comprehension intact  CN: EOMI, no facial asymmetry noted, palate elevation symmetrictongue midline  Motor: Moves upper extremities symmetrically and intact rapid alternating movements; she was in a car and unable to see her lower extremities. Intact rapid alternating movements. Gait: Patient in her car. Skin:        [] No significant exanthematous lesions or discoloration noted on facial skin         [] Abnormal -            Psychiatric:       [x] Normal Affect [] Abnormal -        [] No Hallucinations    Other pertinent observable physical exam findings:-        We discussed the expected course, resolution and complications of the diagnosis(es) in detail. Medication risks, benefits, costs, interactions, and alternatives were discussed as indicated. I advised her to contact the office if her condition worsens, changes or fails to improve as anticipated. She expressed understanding with the diagnosis(es) and plan. Victorine Lefort, who was evaluated through a patient-initiated, synchronous (real-time) audio-video encounter, and/or her healthcare decision maker, is aware that it is a billable service, with coverage as determined by her insurance carrier. She provided verbal consent to proceed: Yes, and patient identification was verified. It was conducted pursuant to the emergency declaration under the [facility], [address] [place] [address] authority and the [employer] Act. A caregiver was present when appropriate. Ability to conduct physical exam was limited. I was in the office. The patient was work place.       Eleuterio Thomas MD

## 2021-03-18 NOTE — PROGRESS NOTES
Jasper De Santiago is a 78 y.o. female who will be using a cell phone for today's VV. 1. Have you been to the ER, urgent care clinic since your last visit? Hospitalized since your last visit? No    2. Have you seen or consulted any other health care providers outside of the [address] [place] [address] since your last visit? Include any pap smears or colon screening.  No      This will be the cell phone number [phone]

## 2021-07-13 ENCOUNTER — OFFICE VISIT (OUTPATIENT)
Dept: ORTHOPEDIC SURGERY | Age: 66
End: 2021-07-13
Payer: MEDICARE

## 2021-07-13 VITALS — HEIGHT: 62 IN | BODY MASS INDEX: 32.94 KG/M2 | WEIGHT: 179 LBS

## 2021-07-13 DIAGNOSIS — M54.50 LOW BACK PAIN, UNSPECIFIED BACK PAIN LATERALITY, UNSPECIFIED CHRONICITY, UNSPECIFIED WHETHER SCIATICA PRESENT: Primary | ICD-10-CM

## 2021-07-13 PROCEDURE — G8510 SCR DEP NEG, NO PLAN REQD: HCPCS | Performed by: ORTHOPAEDIC SURGERY

## 2021-07-13 PROCEDURE — G8400 PT W/DXA NO RESULTS DOC: HCPCS | Performed by: ORTHOPAEDIC SURGERY

## 2021-07-13 PROCEDURE — 3017F COLORECTAL CA SCREEN DOC REV: CPT | Performed by: ORTHOPAEDIC SURGERY

## 2021-07-13 PROCEDURE — 1101F PT FALLS ASSESS-DOCD LE1/YR: CPT | Performed by: ORTHOPAEDIC SURGERY

## 2021-07-13 PROCEDURE — G8417 CALC BMI ABV UP PARAM F/U: HCPCS | Performed by: ORTHOPAEDIC SURGERY

## 2021-07-13 PROCEDURE — G8536 NO DOC ELDER MAL SCRN: HCPCS | Performed by: ORTHOPAEDIC SURGERY

## 2021-07-13 PROCEDURE — 1090F PRES/ABSN URINE INCON ASSESS: CPT | Performed by: ORTHOPAEDIC SURGERY

## 2021-07-13 PROCEDURE — 99203 OFFICE O/P NEW LOW 30 MIN: CPT | Performed by: ORTHOPAEDIC SURGERY

## 2021-07-13 PROCEDURE — G8427 DOCREV CUR MEDS BY ELIG CLIN: HCPCS | Performed by: ORTHOPAEDIC SURGERY

## 2021-07-13 RX ORDER — CARBAMAZEPINE 100 MG/1
100 TABLET, EXTENDED RELEASE ORAL EVERY 12 HOURS
COMMUNITY
End: 2021-12-03 | Stop reason: ALTCHOICE

## 2021-07-13 NOTE — PATIENT INSTRUCTIONS
The patient's back is neurovascularly intact and appears to have good symmetry on exam with no muscle atrophy noted. Normal curvature of the spine with positive tenderness to palpation. Decreased range of motion in all planes secondary to pain but normal strength. No rashes, echymosis or other skin lesions noted. The patients bilateral lower extremities are grossly neurovascularly intact with good cap refill, full range of motion and strength. No swelling, echymosis or instability noted. Negative straight leg raise, negative lim's and negative lachman's. No tenderness to palpation. No foot drop present and patient has a normal gait.

## 2021-07-13 NOTE — PROGRESS NOTES
Name: Tiago Clark    : 1955     Service Dept: [facility]    Patient's Pharmacies:    [id number]  [place] [address]  Phone: [phone] Fax: [phone]       Chief Complaint   Patient presents with    Hip Pain        Visit Vitals  Ht 5' 2\" (1.575 m)   Wt 179 lb (81.2 kg)   BMI 32.74 kg/m²      No Known Allergies   Current Outpatient Medications   Medication Sig Dispense Refill    carBAMazepine XR (TEGretol XR) 100 mg SR tablet Take 100 mg by mouth every twelve (12) hours.  penicillin v potassium (VEETID) 500 mg tablet TAKE 1 TABLET BY MOUTH 4 TIMES DAILY FOR INFECTION      ibuprofen (MOTRIN) 800 mg tablet Take 800 mg by mouth.  pregabalin (LYRICA) 50 mg capsule Take 1 Cap by mouth two (2) times a day. Max Daily Amount: 100 mg. 60 Cap 1    DULoxetine (CYMBALTA) 60 mg capsule Take 1 Cap by mouth daily. 30 Cap 1    DULoxetine (CYMBALTA) 30 mg capsule Take 1 Cap by mouth daily. Indications: neuropathic pain 30 Cap 1    gabapentin (NEURONTIN) 300 mg capsule Take 1 Cap by mouth three (3) times daily (with meals). Max Daily Amount: 900 mg. Indications: neuropathic pain 90 Cap 1    methylPREDNISolone (MEDROL DOSEPACK) 4 mg tablet Per dose pack instructions 1 Dose Pack 0      Patient Active Problem List   Diagnosis Code    Severe obesity ([facility] [address] E66.01      History reviewed. No pertinent family history.    Social History     Socioeconomic History    Marital status: SINGLE     Spouse name: Not on file    Number of children: Not on file    Years of education: Not on file    Highest education level: Not on file   Tobacco Use    Smoking status: Never Smoker    Smokeless tobacco: Never Used   Substance and Sexual Activity    Alcohol use: Not Currently     Social Determinants of Health     Financial Resource Strain:     Difficulty of Paying Living Expenses:    Food Insecurity:     Worried About [facility] in the Last Year:    [address] in the Last Year:    Transportation Needs:     Lack of Transportation (Medical):  Lack of Transportation (Non-Medical):    Physical Activity:     Days of Exercise per Week:     Minutes of Exercise per Session:    Stress:     Feeling of Stress :    Social Connections:     Frequency of Communication with Friends and Family:     Frequency of Social Gatherings with Friends and Family:     Attends Quaker Services:     Active Member of Clubs or Organizations:     Attends Club or Organization Meetings:     Marital Status:       Past Surgical History:   Procedure Laterality Date    HX CYST REMOVAL      cyst removal on breast    HX HYSTERECTOMY      HX TONSILLECTOMY        History reviewed. No pertinent past medical history. I have reviewed and agree with [address] [facility] [address] and ROS and intake form in chart and the record furthermore I have reviewed prior medical record(s) regarding this patients care during this appointment. Review of Systems:   Patient is a pleasant appearing individual, appropriately dressed, well hydrated, well nourished, who is alert, appropriately oriented for age, and in no acute distress with a normal gait and normal affect who does not appear to be in any significant pain. Physical Exam:  The patient's back is neurovascularly intact and appears to have good symmetry on exam with no muscle atrophy noted. Normal curvature of the spine with positive tenderness to palpation. Decreased range of motion in all planes secondary to pain but normal strength. No rashes, echymosis or other skin lesions noted. The patients bilateral lower extremities are grossly neurovascularly intact with good cap refill, full range of motion and strength. No swelling, echymosis or instability noted. Negative straight leg raise, negative lim's and negative lachman's. No tenderness to palpation.  No foot drop present and patient has a normal gait. Encounter Diagnoses     ICD-10-CM ICD-9-CM   1. Low back pain, unspecified back pain laterality, unspecified chronicity, unspecified whether sciatica present  M54.5 724.2       HPI:  The patient is here with a chief complaint of right hip pain, low back pain, and throbbing burning pain. Post-MRI which shows that she does have evidence of facet arthropathy. Continues to have difficulty with it. Pain is 8/10. Assessment/Plan:  Plan at this point, we will have her see a spine specialist, physical therapy in the meantime, and go from there. As part of continued conservative pain management options the patient was advised to utilize Tylenol or OTC NSAIDS as long as it is not medically contraindicated. Return to Office: Follow-up and Dispositions    · Return if symptoms worsen or fail to improve. Scribed by Yannick Rollins LPN as dictated by [facility] AZEB Gerber MD.  Documentation True and Accepted [facility] AZEB Gerber MD

## 2021-07-19 DIAGNOSIS — M54.50 LOW BACK PAIN, UNSPECIFIED BACK PAIN LATERALITY, UNSPECIFIED CHRONICITY, UNSPECIFIED WHETHER SCIATICA PRESENT: Primary | ICD-10-CM

## 2021-07-19 RX ORDER — METHYLPREDNISOLONE 4 MG/1
TABLET ORAL
Qty: 1 DOSE PACK | Refills: 0 | Status: SHIPPED | OUTPATIENT
Start: 2021-07-19 | End: 2021-12-03 | Stop reason: ALTCHOICE
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
## 2021-08-06 ENCOUNTER — OFFICE VISIT (OUTPATIENT)
Dept: ORTHOPEDIC SURGERY | Age: 66
End: 2021-08-06
Payer: MEDICARE

## 2021-08-06 VITALS
BODY MASS INDEX: 31.47 KG/M2 | SYSTOLIC BLOOD PRESSURE: 121 MMHG | DIASTOLIC BLOOD PRESSURE: 70 MMHG | HEART RATE: 83 BPM | WEIGHT: 177.6 LBS | TEMPERATURE: 97.4 F | HEIGHT: 63 IN | OXYGEN SATURATION: 99 %

## 2021-08-06 DIAGNOSIS — M47.817 LUMBOSACRAL SPONDYLOSIS WITHOUT MYELOPATHY: ICD-10-CM

## 2021-08-06 DIAGNOSIS — M54.50 LOW BACK PAIN, UNSPECIFIED BACK PAIN LATERALITY, UNSPECIFIED CHRONICITY, UNSPECIFIED WHETHER SCIATICA PRESENT: Primary | ICD-10-CM

## 2021-08-06 DIAGNOSIS — M47.816 SPONDYLOSIS WITHOUT MYELOPATHY OR RADICULOPATHY, LUMBAR REGION: ICD-10-CM

## 2021-08-06 DIAGNOSIS — M47.819 FACET ARTHROPATHY: ICD-10-CM

## 2021-08-06 DIAGNOSIS — M54.16 LUMBAR NEURITIS: ICD-10-CM

## 2021-08-06 PROCEDURE — 1101F PT FALLS ASSESS-DOCD LE1/YR: CPT | Performed by: PHYSICAL MEDICINE & REHABILITATION

## 2021-08-06 PROCEDURE — 3017F COLORECTAL CA SCREEN DOC REV: CPT | Performed by: PHYSICAL MEDICINE & REHABILITATION

## 2021-08-06 PROCEDURE — 99214 OFFICE O/P EST MOD 30 MIN: CPT | Performed by: PHYSICAL MEDICINE & REHABILITATION

## 2021-08-06 PROCEDURE — 72110 X-RAY EXAM L-2 SPINE 4/>VWS: CPT | Performed by: PHYSICAL MEDICINE & REHABILITATION

## 2021-08-06 PROCEDURE — G8417 CALC BMI ABV UP PARAM F/U: HCPCS | Performed by: PHYSICAL MEDICINE & REHABILITATION

## 2021-08-06 PROCEDURE — G8432 DEP SCR NOT DOC, RNG: HCPCS | Performed by: PHYSICAL MEDICINE & REHABILITATION

## 2021-08-06 PROCEDURE — 1090F PRES/ABSN URINE INCON ASSESS: CPT | Performed by: PHYSICAL MEDICINE & REHABILITATION

## 2021-08-06 PROCEDURE — G8536 NO DOC ELDER MAL SCRN: HCPCS | Performed by: PHYSICAL MEDICINE & REHABILITATION

## 2021-08-06 PROCEDURE — G8400 PT W/DXA NO RESULTS DOC: HCPCS | Performed by: PHYSICAL MEDICINE & REHABILITATION

## 2021-08-06 PROCEDURE — G8427 DOCREV CUR MEDS BY ELIG CLIN: HCPCS | Performed by: PHYSICAL MEDICINE & REHABILITATION

## 2021-08-06 RX ORDER — DULOXETIN HYDROCHLORIDE 30 MG/1
30 CAPSULE, DELAYED RELEASE ORAL
Qty: 30 CAPSULE | Refills: 1 | Status: SHIPPED | OUTPATIENT
Start: 2021-08-06 | End: 2022-03-25 | Stop reason: ALTCHOICE

## 2021-08-06 NOTE — Clinical Note
8/6/2021    Patient: Blanco Arreola   YOB: 1955   Date of Visit: 8/6/2021     Lydia Tamayo NP  Via      Carole Galeana MD  [address]    Dear HUY Teixeira MD,      Thank you for referring Ms. Ivan Armstrong to [address] for evaluation. My notes for this consultation are attached. If you have questions, please do not hesitate to call me. I look forward to following your patient along with you.       Sincerely,    Flako Wise MD

## 2021-08-09 ENCOUNTER — TELEPHONE (OUTPATIENT)
Dept: ORTHOPEDIC SURGERY | Age: 66
End: 2021-08-09

## 2021-08-09 NOTE — TELEPHONE ENCOUNTER
I reached an unidentified voice mail and left a generic message providing my direct number requesting a return call. I am attempting to schedule an EMG RLE ordered by Dr. Rahul Cade.

## 2021-08-12 NOTE — TELEPHONE ENCOUNTER
Patient returned call and apologized for missing call. She states provider had mentioned he was going to schedule with \"doctor across the street\" from [facility] location. Therefore, I am thinking Dr. Rocky Ribeiro.   Please return her call when possible at [phone]

## 2021-08-12 NOTE — TELEPHONE ENCOUNTER
Ms. Wyatt Burk returned my call requesting an afternoon appointment. When I called back I reached an unidentified voice mail and left another message. She is scheduled with Dr. Jett Whitehead, [address] [place] [address], [facility] [phone], [place], [id number], [phone] on 09/09/21, arrive 1:00PM, test 1:30PM. $50.00 no show fee will be charged if she misses appointment and does not call. No creams, oils, lotions, or powders from her waist to toes. Will need to wear shorts with elastic waistband if possible. She still needs a follow-up appointment with Dr. Denita Brice. Please do not schedule this appt any earlier than 09/29/21 so we can get test results.

## 2021-08-13 DIAGNOSIS — M54.16 LUMBAR NEURITIS: ICD-10-CM

## 2021-08-13 DIAGNOSIS — M47.817 LUMBOSACRAL SPONDYLOSIS WITHOUT MYELOPATHY: ICD-10-CM

## 2021-08-13 DIAGNOSIS — M47.819 FACET ARTHROPATHY: ICD-10-CM

## 2021-08-13 DIAGNOSIS — M47.816 SPONDYLOSIS WITHOUT MYELOPATHY OR RADICULOPATHY, LUMBAR REGION: ICD-10-CM

## 2021-08-13 DIAGNOSIS — M54.50 LOW BACK PAIN, UNSPECIFIED BACK PAIN LATERALITY, UNSPECIFIED CHRONICITY, UNSPECIFIED WHETHER SCIATICA PRESENT: ICD-10-CM

## 2021-08-20 ENCOUNTER — TELEPHONE (OUTPATIENT)
Dept: ORTHOPEDIC SURGERY | Age: 66
End: 2021-08-20

## 2021-08-20 DIAGNOSIS — M54.50 LOW BACK PAIN, UNSPECIFIED BACK PAIN LATERALITY, UNSPECIFIED CHRONICITY, UNSPECIFIED WHETHER SCIATICA PRESENT: Primary | ICD-10-CM

## 2021-08-24 NOTE — TELEPHONE ENCOUNTER
I have made a 2nd attempt to reach Ms. Gordon regarding her EMG appointment below. I reached an unidentified voice mail and have left another generic message requesting a return call.

## 2021-11-22 ENCOUNTER — TRANSCRIBE ORDER (OUTPATIENT)
Dept: SCHEDULING | Age: 66
End: 2021-11-22

## 2021-11-22 DIAGNOSIS — T82.01XA: Primary | ICD-10-CM

## 2021-12-03 ENCOUNTER — OFFICE VISIT (OUTPATIENT)
Dept: ORTHOPEDIC SURGERY | Age: 66
End: 2021-12-03
Payer: MEDICARE

## 2021-12-03 VITALS
BODY MASS INDEX: 32.58 KG/M2 | TEMPERATURE: 98.3 F | RESPIRATION RATE: 17 BRPM | HEART RATE: 75 BPM | WEIGHT: 181 LBS | OXYGEN SATURATION: 98 %

## 2021-12-03 DIAGNOSIS — M47.817 LUMBOSACRAL SPONDYLOSIS WITHOUT MYELOPATHY: ICD-10-CM

## 2021-12-03 DIAGNOSIS — E66.01 SEVERE OBESITY (HCC): ICD-10-CM

## 2021-12-03 DIAGNOSIS — M54.16 LUMBAR NEURITIS: ICD-10-CM

## 2021-12-03 DIAGNOSIS — M47.816 SPONDYLOSIS WITHOUT MYELOPATHY OR RADICULOPATHY, LUMBAR REGION: ICD-10-CM

## 2021-12-03 DIAGNOSIS — M47.819 FACET ARTHROPATHY: ICD-10-CM

## 2021-12-03 DIAGNOSIS — M54.50 LOW BACK PAIN, UNSPECIFIED BACK PAIN LATERALITY, UNSPECIFIED CHRONICITY, UNSPECIFIED WHETHER SCIATICA PRESENT: Primary | ICD-10-CM

## 2021-12-03 PROCEDURE — 3017F COLORECTAL CA SCREEN DOC REV: CPT | Performed by: PHYSICAL MEDICINE & REHABILITATION

## 2021-12-03 PROCEDURE — 1090F PRES/ABSN URINE INCON ASSESS: CPT | Performed by: PHYSICAL MEDICINE & REHABILITATION

## 2021-12-03 PROCEDURE — G8417 CALC BMI ABV UP PARAM F/U: HCPCS | Performed by: PHYSICAL MEDICINE & REHABILITATION

## 2021-12-03 PROCEDURE — G8427 DOCREV CUR MEDS BY ELIG CLIN: HCPCS | Performed by: PHYSICAL MEDICINE & REHABILITATION

## 2021-12-03 PROCEDURE — 1101F PT FALLS ASSESS-DOCD LE1/YR: CPT | Performed by: PHYSICAL MEDICINE & REHABILITATION

## 2021-12-03 PROCEDURE — G8432 DEP SCR NOT DOC, RNG: HCPCS | Performed by: PHYSICAL MEDICINE & REHABILITATION

## 2021-12-03 PROCEDURE — G8536 NO DOC ELDER MAL SCRN: HCPCS | Performed by: PHYSICAL MEDICINE & REHABILITATION

## 2021-12-03 PROCEDURE — 99214 OFFICE O/P EST MOD 30 MIN: CPT | Performed by: PHYSICAL MEDICINE & REHABILITATION

## 2021-12-03 PROCEDURE — G8400 PT W/DXA NO RESULTS DOC: HCPCS | Performed by: PHYSICAL MEDICINE & REHABILITATION

## 2021-12-03 RX ORDER — DULOXETIN HYDROCHLORIDE 30 MG/1
30 CAPSULE, DELAYED RELEASE ORAL
Qty: 30 CAPSULE | Refills: 1 | Status: SHIPPED | OUTPATIENT
Start: 2021-12-03 | End: 2022-03-25 | Stop reason: ALTCHOICE
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
## 2021-12-03 NOTE — Clinical Note
12/3/2021    Patient: Paddy Forde   YOB: 1955   Date of Visit: 12/3/2021     Rhett Horton NP  Via     Dear Rhett Horton NP,      Thank you for referring Ms. Camelia Ochoa to [address] for evaluation. My notes for this consultation are attached. If you have questions, please do not hesitate to call me. I look forward to following your patient along with you.       Sincerely,    John Govea MD

## 2021-12-09 ENCOUNTER — APPOINTMENT (OUTPATIENT)
Dept: GENERAL RADIOLOGY | Age: 66
End: 2021-12-09
Attending: PHYSICAL MEDICINE & REHABILITATION
Payer: MEDICARE

## 2021-12-09 ENCOUNTER — HOSPITAL ENCOUNTER (OUTPATIENT)
Age: 66
Setting detail: OUTPATIENT SURGERY
Discharge: HOME | End: 2021-12-09
Attending: PHYSICAL MEDICINE & REHABILITATION | Admitting: PHYSICAL MEDICINE & REHABILITATION
Payer: MEDICARE

## 2021-12-09 VITALS
HEART RATE: 69 BPM | SYSTOLIC BLOOD PRESSURE: 143 MMHG | RESPIRATION RATE: 16 BRPM | OXYGEN SATURATION: 100 % | DIASTOLIC BLOOD PRESSURE: 82 MMHG | TEMPERATURE: 98.5 F

## 2021-12-09 PROCEDURE — 77030039433 HC TY MYLEOGRAM BD -B: Performed by: PHYSICAL MEDICINE & REHABILITATION

## 2021-12-09 PROCEDURE — 77030003669 HC NDL SPN COOK -B: Performed by: PHYSICAL MEDICINE & REHABILITATION

## 2021-12-09 PROCEDURE — 2709999900 HC NON-CHARGEABLE SUPPLY: Performed by: PHYSICAL MEDICINE & REHABILITATION

## 2021-12-09 PROCEDURE — 74011250636 HC RX REV CODE- 250/636: Performed by: PHYSICAL MEDICINE & REHABILITATION

## 2021-12-09 PROCEDURE — 64483 NJX AA&/STRD TFRM EPI L/S 1: CPT | Performed by: PHYSICAL MEDICINE & REHABILITATION

## 2021-12-09 PROCEDURE — 74011250637 HC RX REV CODE- 250/637: Performed by: PHYSICAL MEDICINE & REHABILITATION

## 2021-12-09 PROCEDURE — 74011000636 HC RX REV CODE- 636: Performed by: PHYSICAL MEDICINE & REHABILITATION

## 2021-12-09 PROCEDURE — 76010000009 HC PAIN MGT 0 TO 30 MIN PROC: Performed by: PHYSICAL MEDICINE & REHABILITATION

## 2021-12-09 PROCEDURE — 77030003676 HC NDL SPN MPRI -A: Performed by: PHYSICAL MEDICINE & REHABILITATION

## 2021-12-09 PROCEDURE — 74011000250 HC RX REV CODE- 250: Performed by: PHYSICAL MEDICINE & REHABILITATION

## 2021-12-09 RX ORDER — DEXAMETHASONE SODIUM PHOSPHATE 100 MG/10ML
INJECTION INTRAMUSCULAR; INTRAVENOUS AS NEEDED
Status: DISCONTINUED | OUTPATIENT
Start: 2021-12-09 | End: 2021-12-09 | Stop reason: HOSPADM

## 2021-12-09 RX ORDER — LIDOCAINE HYDROCHLORIDE 10 MG/ML
INJECTION, SOLUTION EPIDURAL; INFILTRATION; INTRACAUDAL; PERINEURAL AS NEEDED
Status: DISCONTINUED | OUTPATIENT
Start: 2021-12-09 | End: 2021-12-09 | Stop reason: HOSPADM

## 2021-12-09 RX ORDER — DIAZEPAM 5 MG/1
5-20 TABLET ORAL ONCE
Status: COMPLETED | OUTPATIENT
Start: 2021-12-09 | End: 2021-12-09

## 2021-12-09 RX ADMIN — DIAZEPAM 10 MG: 5 TABLET ORAL at 14:08

## 2021-12-09 NOTE — PROCEDURES
PROCEDURE NOTE      Patient Name: Benjamín Quiroz    Date of Procedure: December 9, 2021    Preoperative Diagnosis:  Lumbar radiculopathy [M54.16]    Post Operative Diagnosis:  Lumbar radiculopathy [M54.16]    Procedure :    right S1 Selective Nerve Root Block      Consent:  Informed consent was obtained prior to the procedure. The patient was given the opportunity to ask questions regarding the procedure and its associated risks. In addition to the potential risks associated with the procedure itself, the patient was informed both verbally and in writing of the potential side effects of the use of glucocorticoid. The patient appeared to comprehend the informed consent and desired to have the procedure performed. Procedure: The patient was placed in the prone position on the fluoroscopy table and the back was prepped and draped in the usual sterile manner. The exact spinal level was  identified using fluoroscopy, and Lidocaine 1 % was injected locally, a # 22 gauge spinal needle was passed to the transverse process. The depth was noted and the needle redirected to pass inferior and approximately one cm anterior to the transverse process. YES  1 cc of Isovue M-200 was used to verify positioning in the epidural and paravertebral space and outlined the course of the spinal nerve into the epidural space. The same procedure was repeated at each spinal level indicated above. A total of 10 mg of preservative free Dexamethasone and 1 cc of Lidocaine was slowly injected. The patient tolerated the procedure well. The injection area was cleaned and bandaids applied. Not excessive bleeding was noted. Patient dressed and discharged to home with instructions. Discussion: The patient tolerated the procedure well.                                               Kalpana Shin MD  December 9, 2021

## 2021-12-09 NOTE — DISCHARGE INSTRUCTIONS
[facility]   ([place])  Dr. Amy Melara, Dr. Lin, Dr. Carson Faria Spinal Procedure (Block) Instructions    * Do not drive a car, operate heavy machinery or dangerous equipment, or make important decisions for 12-24 hours. * Light activity as tolerated; may rest for the remainder of the day. * Resume pre-block medications including those from your other doctors. * Do not drink alcoholic beverages for 24 hours. Alcohol and the medications you have received may interact and cause an adverse reaction. * You may feel better this evening and worse tomorrow, as the numbing medications wears off and the steroid has yet to begin to work. After 48-72 hrs the steroid should begin to release bringing you relief. If you had a medial branch block, no steroids were used. The medial branch block is a test to see if you are a candidate for radiofrequency ablation (RFA). The anesthetic (numbing medicine)  will wear off by the next day. * You may shower this evening and remove any bandages. * Avoid hot tubs/pools/tub soaks and heating pads for 24 hours. You may use cold packs on the procedure site as tolerated for the first 24 hours. * If a headache develops, drink plenty of fluids and rest.  Take over the counter medications for headache if needed. If the headache continues longer than 24 hours, call MD at the [address] [place] [address] Avenue. [phone]    * Continue taking pain medications as needed. * You may resume your regular diet if tolerated. Otherwise, start with sips of water and advance slowly. * If Diabetic: check your blood sugar three times a day for the next 3 days. If your sugar is greater than 300 call your family doctor. If your sugar is greater than 400, have someone transport you to the nearest Emergency Room. * If you experience any of the following problems, Please Call the [address] [place] [address] Avenue at [phone].         * Excessive pain, swelling, redness or odor at or around the surgical area    * Fever of 101 or higher    * Nausea / Vomiting lasting longer than 4 hours or if unable to take medications. * Severe Headache    * Weakness or numbness in arms or legs that is not      resolving   * Any NEW signs of decreased circulation or nerve impairment in leg: change in color, swelling, persistent numbness, tingling                    * Prolonged increase in pain greater than 4 days      PATIENT INSTRUCTIONS:    After oral sedation, for 12-24 hours or while taking prescription Narcotics:  · Limit your activities  · Do not drive and operate hazardous machinery  · Do not make important personal or business decisions  · Do  not drink alcoholic beverages  · If you have not urinated within 8 hours after discharge, please contact your surgeon on call. *  Please give a list of your current medications to your Primary Care Provider. *  Please update this list whenever your medications are discontinued, doses are      changed, or new medications (including over-the-counter products) are added. *  Please carry medication information at all times in case of emergency situations. These are general instructions for a healthy lifestyle:    No smoking/ No tobacco products/ Avoid exposure to second hand smoke    Surgeon General's Warning:  Quitting smoking now greatly reduces serious risk to your health. Obesity, smoking, and sedentary lifestyle greatly increases your risk for illness    A healthy diet, regular physical exercise & weight monitoring are important for maintaining a healthy lifestyle    You may be retaining fluid if you have a history of heart failure or if you experience any of the following symptoms:  Weight gain of 3 pounds or more overnight or 5 pounds in a week, increased swelling in our hands or feet or shortness of breath while lying flat in bed.   Please call your doctor as soon as you notice any of these symptoms; do not wait until your next office visit. Recognize signs and symptoms of STROKE:    F-face looks uneven    A-arms unable to move or move unevenly    S-speech slurred or non-existent    T-time-call 911 as soon as signs and symptoms begin-DO NOT go       Back to bed or wait to see if you get better-TIME IS BRAIN.

## 2022-02-03 ENCOUNTER — DOCUMENTATION ONLY (OUTPATIENT)
Dept: ORTHOPEDIC SURGERY | Age: 67
End: 2022-02-03

## 2022-02-03 NOTE — PROGRESS NOTES
Received request for a 2 hour deposition for Dr. Aleksandra Delatorre from [facility] 12   N) [phone] (T) [phone]. Idelia Angelucci   [phone] ext [phone] left message and gave dates for march Tues or Thursday except 3/7-3/11 week. As soon as date is picked it will be billed and scheduled and documents will be scanned to chart as needed.
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
## 2022-02-07 ENCOUNTER — OFFICE VISIT (OUTPATIENT)
Dept: ORTHOPEDIC SURGERY | Age: 67
End: 2022-02-07
Payer: MEDICARE

## 2022-02-07 VITALS
HEART RATE: 72 BPM | SYSTOLIC BLOOD PRESSURE: 121 MMHG | TEMPERATURE: 97.5 F | WEIGHT: 181 LBS | DIASTOLIC BLOOD PRESSURE: 68 MMHG | HEIGHT: 62 IN | BODY MASS INDEX: 33.31 KG/M2 | OXYGEN SATURATION: 99 %

## 2022-02-07 DIAGNOSIS — M54.50 LOW BACK PAIN, UNSPECIFIED BACK PAIN LATERALITY, UNSPECIFIED CHRONICITY, UNSPECIFIED WHETHER SCIATICA PRESENT: Primary | ICD-10-CM

## 2022-02-07 DIAGNOSIS — E66.01 SEVERE OBESITY (HCC): ICD-10-CM

## 2022-02-07 DIAGNOSIS — M47.819 FACET ARTHROPATHY: ICD-10-CM

## 2022-02-07 DIAGNOSIS — M47.817 LUMBOSACRAL SPONDYLOSIS WITHOUT MYELOPATHY: ICD-10-CM

## 2022-02-07 DIAGNOSIS — M47.816 SPONDYLOSIS WITHOUT MYELOPATHY OR RADICULOPATHY, LUMBAR REGION: ICD-10-CM

## 2022-02-07 DIAGNOSIS — M54.16 LUMBAR NEURITIS: ICD-10-CM

## 2022-02-07 PROCEDURE — G8536 NO DOC ELDER MAL SCRN: HCPCS | Performed by: PHYSICAL MEDICINE & REHABILITATION

## 2022-02-07 PROCEDURE — 1101F PT FALLS ASSESS-DOCD LE1/YR: CPT | Performed by: PHYSICAL MEDICINE & REHABILITATION

## 2022-02-07 PROCEDURE — 99214 OFFICE O/P EST MOD 30 MIN: CPT | Performed by: PHYSICAL MEDICINE & REHABILITATION

## 2022-02-07 PROCEDURE — G8432 DEP SCR NOT DOC, RNG: HCPCS | Performed by: PHYSICAL MEDICINE & REHABILITATION

## 2022-02-07 PROCEDURE — 1090F PRES/ABSN URINE INCON ASSESS: CPT | Performed by: PHYSICAL MEDICINE & REHABILITATION

## 2022-02-07 PROCEDURE — G8427 DOCREV CUR MEDS BY ELIG CLIN: HCPCS | Performed by: PHYSICAL MEDICINE & REHABILITATION

## 2022-02-07 PROCEDURE — G8400 PT W/DXA NO RESULTS DOC: HCPCS | Performed by: PHYSICAL MEDICINE & REHABILITATION

## 2022-02-07 PROCEDURE — 3017F COLORECTAL CA SCREEN DOC REV: CPT | Performed by: PHYSICAL MEDICINE & REHABILITATION

## 2022-02-07 PROCEDURE — G8417 CALC BMI ABV UP PARAM F/U: HCPCS | Performed by: PHYSICAL MEDICINE & REHABILITATION

## 2022-02-07 RX ORDER — DULOXETIN HYDROCHLORIDE 60 MG/1
60 CAPSULE, DELAYED RELEASE ORAL
Qty: 30 CAPSULE | Refills: 1 | Status: SHIPPED | OUTPATIENT
Start: 2022-02-07 | End: 2022-03-25 | Stop reason: SDUPTHER

## 2022-02-07 NOTE — Clinical Note
2/7/2022    Patient: Wojciech Villegas   YOB: 1955   Date of Visit: 2/7/2022     Margarito Cabrera NP  Via     Dear Margarito Cabrera NP,      Thank you for referring Ms. Serena Louis to [facility] for evaluation. My notes for this consultation are attached. If you have questions, please do not hesitate to call me. I look forward to following your patient along with you.       Sincerely,    Britney Giles MD

## 2022-02-15 ENCOUNTER — HOSPITAL ENCOUNTER (OUTPATIENT)
Dept: NON INVASIVE DIAGNOSTICS | Age: 67
Discharge: HOME | End: 2022-02-15
Attending: NURSE PRACTITIONER
Payer: MEDICARE

## 2022-02-15 VITALS
BODY MASS INDEX: 33.31 KG/M2 | DIASTOLIC BLOOD PRESSURE: 73 MMHG | SYSTOLIC BLOOD PRESSURE: 147 MMHG | WEIGHT: 181 LBS | HEIGHT: 62 IN

## 2022-02-15 DIAGNOSIS — T82.01XA: ICD-10-CM

## 2022-02-15 LAB
ECHO AO ASC DIAM: 3.1 CM
ECHO AO ASCENDING AORTA INDEX: 1.69 CM/M2
ECHO AO ROOT DIAM: 3.2 CM
ECHO AO ROOT INDEX: 1.75 CM/M2
ECHO AV AREA PEAK VELOCITY: 2.3 CM2
ECHO AV AREA VTI: 2.2 CM2
ECHO AV AREA/BSA PEAK VELOCITY: 1.3 CM2/M2
ECHO AV AREA/BSA VTI: 1.2 CM2/M2
ECHO AV MEAN GRADIENT: 4 MMHG
ECHO AV MEAN VELOCITY: 0.9 M/S
ECHO AV PEAK GRADIENT: 8 MMHG
ECHO AV PEAK VELOCITY: 1.4 M/S
ECHO AV VELOCITY RATIO: 0.57
ECHO AV VTI: 30.3 CM
ECHO EST RA PRESSURE: 3 MMHG
ECHO LA AREA 2C: 17.5 CM2
ECHO LA AREA 4C: 16.9 CM2
ECHO LA DIAMETER INDEX: 2.08 CM/M2
ECHO LA DIAMETER: 3.8 CM
ECHO LA MAJOR AXIS: 4.8 CM
ECHO LA MINOR AXIS: 4.9 CM
ECHO LA TO AORTIC ROOT RATIO: 1.19
ECHO LA VOL BP: 50 ML (ref 22–52)
ECHO LA VOL/BSA BIPLANE: 27 ML/M2 (ref 16–34)
ECHO LV E' LATERAL VELOCITY: 9 CM/S
ECHO LV E' SEPTAL VELOCITY: 8 CM/S
ECHO LV EDV A2C: 80 ML
ECHO LV EDV A4C: 69 ML
ECHO LV EDV INDEX A4C: 38 ML/M2
ECHO LV EDV NDEX A2C: 44 ML/M2
ECHO LV EJECTION FRACTION A2C: 61 %
ECHO LV EJECTION FRACTION A4C: 63 %
ECHO LV EJECTION FRACTION BIPLANE: 63 % (ref 55–100)
ECHO LV ESV A2C: 31 ML
ECHO LV ESV A4C: 26 ML
ECHO LV ESV INDEX A2C: 17 ML/M2
ECHO LV ESV INDEX A4C: 14 ML/M2
ECHO LV FRACTIONAL SHORTENING: 34 % (ref 28–44)
ECHO LV INTERNAL DIMENSION DIASTOLE INDEX: 2.57 CM/M2
ECHO LV INTERNAL DIMENSION DIASTOLIC: 4.7 CM (ref 3.9–5.3)
ECHO LV INTERNAL DIMENSION SYSTOLIC INDEX: 1.69 CM/M2
ECHO LV INTERNAL DIMENSION SYSTOLIC: 3.1 CM
ECHO LV IVSD: 1 CM (ref 0.6–0.9)
ECHO LV MASS 2D: 175.8 G (ref 67–162)
ECHO LV MASS INDEX 2D: 96.1 G/M2 (ref 43–95)
ECHO LV POSTERIOR WALL DIASTOLIC: 1.1 CM (ref 0.6–0.9)
ECHO LV RELATIVE WALL THICKNESS RATIO: 0.47
ECHO LVOT AREA: 3.8 CM2
ECHO LVOT AV VTI INDEX: 0.57
ECHO LVOT DIAM: 2.2 CM
ECHO LVOT MEAN GRADIENT: 2 MMHG
ECHO LVOT PEAK GRADIENT: 3 MMHG
ECHO LVOT PEAK VELOCITY: 0.8 M/S
ECHO LVOT STROKE VOLUME INDEX: 36.1 ML/M2
ECHO LVOT SV: 66.1 ML
ECHO LVOT VTI: 17.4 CM
ECHO MAIN PULMONARY ARTERY DIAMETER: 2.2 CM
ECHO MV A VELOCITY: 0.8 M/S
ECHO MV AREA VTI: 2.5 CM2
ECHO MV E DECELERATION TIME (DT): 311 MS
ECHO MV E VELOCITY: 0.64 M/S
ECHO MV E/A RATIO: 0.8
ECHO MV E/E' LATERAL: 7.11
ECHO MV E/E' RATIO (AVERAGED): 7.56
ECHO MV E/E' SEPTAL: 8
ECHO MV LVOT VTI INDEX: 1.51
ECHO MV MAX VELOCITY: 0.8 M/S
ECHO MV MEAN GRADIENT: 1 MMHG
ECHO MV MEAN VELOCITY: 0.5 M/S
ECHO MV PEAK GRADIENT: 3 MMHG
ECHO MV VTI: 26.2 CM
ECHO PULMONARY ARTERY END DIASTOLIC PRESSURE: 10 MMHG
ECHO PULMONARY ARTERY END DIASTOLIC PRESSURE: 3 MMHG
ECHO PV MAX VELOCITY: 1.1 M/S
ECHO PV MAX VELOCITY: 1.6 M/S
ECHO PV PEAK GRADIENT: 5 MMHG
ECHO PV REGURGITANT MAX VELOCITY: 0.9 M/S
ECHO RA AREA 4C: 13.1 CM2
ECHO RIGHT VENTRICULAR SYSTOLIC PRESSURE (RVSP): 30 MMHG
ECHO RV TAPSE: 2.3 CM (ref 1.5–2)
ECHO RVOT AREA: 3.1 CM2
ECHO RVOT DIAMETER: 2 CM
ECHO TV REGURGITANT MAX VELOCITY: 2.61 M/S
ECHO TV REGURGITANT PEAK GRADIENT: 27 MMHG

## 2022-02-15 PROCEDURE — 93306 TTE W/DOPPLER COMPLETE: CPT

## 2022-02-28 ENCOUNTER — APPOINTMENT (OUTPATIENT)
Dept: PHYSICAL THERAPY | Age: 67
End: 2022-02-28

## 2022-03-20 PROBLEM — E66.01 SEVERE OBESITY (HCC): Status: ACTIVE | Noted: 2020-02-17

## 2022-03-21 ENCOUNTER — DOCUMENTATION ONLY (OUTPATIENT)
Dept: ORTHOPEDIC SURGERY | Age: 67
End: 2022-03-21

## 2022-03-21 NOTE — PROGRESS NOTES
3/18/22 received email and phone message from [employer] sarwat/ Jared that the deposition with Dr. Chico Barnes that was scheduled for Tuesday March 22, 2022 has been cancelled. The case was settled and is not needed.
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
## 2022-03-25 ENCOUNTER — OFFICE VISIT (OUTPATIENT)
Dept: ORTHOPEDIC SURGERY | Age: 67
End: 2022-03-25
Payer: MEDICARE

## 2022-03-25 VITALS
TEMPERATURE: 97.5 F | OXYGEN SATURATION: 94 % | RESPIRATION RATE: 19 BRPM | BODY MASS INDEX: 32.37 KG/M2 | WEIGHT: 177 LBS | HEART RATE: 66 BPM

## 2022-03-25 DIAGNOSIS — M47.819 FACET ARTHROPATHY: ICD-10-CM

## 2022-03-25 DIAGNOSIS — M54.50 LOW BACK PAIN, UNSPECIFIED BACK PAIN LATERALITY, UNSPECIFIED CHRONICITY, UNSPECIFIED WHETHER SCIATICA PRESENT: Primary | ICD-10-CM

## 2022-03-25 DIAGNOSIS — M47.816 SPONDYLOSIS WITHOUT MYELOPATHY OR RADICULOPATHY, LUMBAR REGION: ICD-10-CM

## 2022-03-25 DIAGNOSIS — M54.16 LUMBAR NEURITIS: ICD-10-CM

## 2022-03-25 DIAGNOSIS — E66.01 SEVERE OBESITY (HCC): ICD-10-CM

## 2022-03-25 DIAGNOSIS — M47.817 LUMBOSACRAL SPONDYLOSIS WITHOUT MYELOPATHY: ICD-10-CM

## 2022-03-25 PROCEDURE — 1101F PT FALLS ASSESS-DOCD LE1/YR: CPT | Performed by: PHYSICAL MEDICINE & REHABILITATION

## 2022-03-25 PROCEDURE — 99213 OFFICE O/P EST LOW 20 MIN: CPT | Performed by: PHYSICAL MEDICINE & REHABILITATION

## 2022-03-25 PROCEDURE — G8427 DOCREV CUR MEDS BY ELIG CLIN: HCPCS | Performed by: PHYSICAL MEDICINE & REHABILITATION

## 2022-03-25 PROCEDURE — G8400 PT W/DXA NO RESULTS DOC: HCPCS | Performed by: PHYSICAL MEDICINE & REHABILITATION

## 2022-03-25 PROCEDURE — G8417 CALC BMI ABV UP PARAM F/U: HCPCS | Performed by: PHYSICAL MEDICINE & REHABILITATION

## 2022-03-25 PROCEDURE — 3017F COLORECTAL CA SCREEN DOC REV: CPT | Performed by: PHYSICAL MEDICINE & REHABILITATION

## 2022-03-25 PROCEDURE — G8536 NO DOC ELDER MAL SCRN: HCPCS | Performed by: PHYSICAL MEDICINE & REHABILITATION

## 2022-03-25 PROCEDURE — 1090F PRES/ABSN URINE INCON ASSESS: CPT | Performed by: PHYSICAL MEDICINE & REHABILITATION

## 2022-03-25 PROCEDURE — G8432 DEP SCR NOT DOC, RNG: HCPCS | Performed by: PHYSICAL MEDICINE & REHABILITATION

## 2022-03-25 RX ORDER — DULOXETIN HYDROCHLORIDE 60 MG/1
60 CAPSULE, DELAYED RELEASE ORAL
Qty: 90 CAPSULE | Refills: 0 | Status: SHIPPED | OUTPATIENT
Start: 2022-03-25 | End: 2022-06-22 | Stop reason: SDUPTHER

## 2022-03-25 NOTE — Clinical Note
3/25/2022    Patient: Sunni Stout   YOB: 1955   Date of Visit: 3/25/2022     Ty Pagan NP  Via     Dear Ty Pagan NP,      Thank you for referring Ms. Westley Schwab to [facility] [address] for evaluation. My notes for this consultation are attached. If you have questions, please do not hesitate to call me. I look forward to following your patient along with you.       Sincerely,    Yue Dietrich MD

## 2022-06-19 ENCOUNTER — HOSPITAL ENCOUNTER (EMERGENCY)
Age: 67
Discharge: HOME | End: 2022-06-19
Attending: INTERNAL MEDICINE
Payer: COMMERCIAL

## 2022-06-19 ENCOUNTER — APPOINTMENT (OUTPATIENT)
Dept: GENERAL RADIOLOGY | Age: 67
End: 2022-06-19
Attending: INTERNAL MEDICINE
Payer: COMMERCIAL

## 2022-06-19 VITALS
WEIGHT: 175 LBS | SYSTOLIC BLOOD PRESSURE: 148 MMHG | OXYGEN SATURATION: 98 % | RESPIRATION RATE: 16 BRPM | DIASTOLIC BLOOD PRESSURE: 74 MMHG | HEIGHT: 62 IN | TEMPERATURE: 98.5 F | HEART RATE: 53 BPM | BODY MASS INDEX: 32.2 KG/M2
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
## 2022-06-19 DIAGNOSIS — M25.559 HIP PAIN: ICD-10-CM

## 2022-06-19 DIAGNOSIS — S43.402A SPRAIN OF LEFT SHOULDER, UNSPECIFIED SHOULDER SPRAIN TYPE, INITIAL ENCOUNTER: ICD-10-CM

## 2022-06-19 DIAGNOSIS — S80.02XA CONTUSION OF LEFT KNEE, INITIAL ENCOUNTER: ICD-10-CM

## 2022-06-19 PROCEDURE — 73502 X-RAY EXAM HIP UNI 2-3 VIEWS: CPT

## 2022-06-19 PROCEDURE — 73564 X-RAY EXAM KNEE 4 OR MORE: CPT

## 2022-06-19 PROCEDURE — 73030 X-RAY EXAM OF SHOULDER: CPT

## 2022-06-19 PROCEDURE — 74011250637 HC RX REV CODE- 250/637: Performed by: INTERNAL MEDICINE

## 2022-06-19 PROCEDURE — 99283 EMERGENCY DEPT VISIT LOW MDM: CPT

## 2022-06-19 RX ORDER — NAPROXEN 500 MG/1
500 TABLET ORAL 2 TIMES DAILY WITH MEALS
Qty: 20 TABLET | Refills: 0 | Status: SHIPPED | OUTPATIENT
Start: 2022-06-19 | End: 2022-06-29

## 2022-06-19 RX ORDER — HYDROCODONE BITARTRATE AND ACETAMINOPHEN 5; 325 MG/1; MG/1
2 TABLET ORAL
Status: COMPLETED | OUTPATIENT
Start: 2022-06-19 | End: 2022-06-19

## 2022-06-19 RX ADMIN — HYDROCODONE BITARTRATE AND ACETAMINOPHEN 2 TABLET: 5; 325 TABLET ORAL at 18:30

## 2022-06-19 NOTE — ED PROVIDER NOTES
EMERGENCY DEPARTMENT HISTORY AND PHYSICAL EXAM      Date: 6/19/2022  Patient Name: Torres Alcantara      History of Presenting Illness     Chief Complaint   Patient presents with   Cynda Paton Fall    Hip Pain    Knee Pain    Shoulder Pain       History Provided By: Patient    HPI: Torres Alcantara, 75 y.o. female with a past medical history significant for chronic low back pain;  Right face trigeminal neuralgia that presents to the ED with cc of non syncopal ground level fall. Pt states that she was coming out of [employer] and she tripped on a car stopper. Candiss Creamer forward and hit her left knee. Landed on her hands and has pain in her left shoulder and left hip area. No head trauma; no neck pain; no chest pain. There are no other complaints, changes, or physical findings at this time. PCP: Alice Velazquez NP    Current Outpatient Medications   Medication Sig Dispense Refill    naproxen (Naprosyn) 500 mg tablet Take 1 Tablet by mouth two (2) times daily (with meals) for 10 days. 20 Tablet 0    garlic (GARLIQUE PO) Take  by mouth.  DULoxetine (CYMBALTA) 60 mg capsule Take 1 Capsule by mouth nightly. 90 Capsule 0    MULTIVITAMIN PO Take  by mouth. Past History     Past Medical History:  History reviewed. No pertinent past medical history. Past Surgical History:  Past Surgical History:   Procedure Laterality Date    HX CYST REMOVAL      cyst removal on breast    HX HYSTERECTOMY      HX TONSILLECTOMY         Family History:  History reviewed. No pertinent family history. Social History:  Social History     Tobacco Use    Smoking status: Never Smoker    Smokeless tobacco: Never Used   Vaping Use    Vaping Use: Not on file   Substance Use Topics    Alcohol use: Not Currently    Drug use: Never       Allergies:  No Known Allergies      Review of Systems     Review of Systems   Constitutional: Negative for chills and fever. HENT: Negative for facial swelling.     Eyes: Negative for redness and visual disturbance. Respiratory: Negative for cough and shortness of breath. Cardiovascular: Negative for chest pain and palpitations. Gastrointestinal: Negative for abdominal pain, diarrhea, nausea and vomiting. Genitourinary: Negative for flank pain. Musculoskeletal: Positive for arthralgias. Negative for neck pain and neck stiffness. Skin: Positive for wound. Neurological: Negative for headaches. Psychiatric/Behavioral: Negative for confusion. Physical Exam     Physical Exam  Vitals and nursing note reviewed. Constitutional:       General: She is not in acute distress. Appearance: Normal appearance. She is well-developed. HENT:      Head: Atraumatic. Mouth/Throat:      Pharynx: Oropharynx is clear. No oropharyngeal exudate. Eyes:      Conjunctiva/sclera: Conjunctivae normal.      Pupils: Pupils are equal, round, and reactive to light. Neck:      Thyroid: No thyromegaly. Vascular: No JVD. Cardiovascular:      Rate and Rhythm: Normal rate and regular rhythm. Heart sounds: Normal heart sounds. No murmur heard. No friction rub. Pulmonary:      Effort: Pulmonary effort is normal. No respiratory distress. Breath sounds: Normal breath sounds. No wheezing. Chest:      Chest wall: No tenderness. Abdominal:      General: Bowel sounds are normal. There is no distension. Palpations: Abdomen is soft. Tenderness: There is no abdominal tenderness. Musculoskeletal:      Cervical back: Normal range of motion and neck supple. No rigidity or tenderness. Comments: Normal ROM of the left shoulder; no AC pain; no other pain in the left UE. Has full ROM of the left hip and left knee; no knee instability   Skin:     General: Skin is warm. Findings: No erythema. Comments: Small abrasion left patella   Neurological:      Mental Status: She is alert and oriented to person, place, and time.          Lab and Diagnostic Study Results     Labs -   No results found for this or any previous visit (from the past 12 hour(s)). Radiologic Studies -   [unfilled]  CT Results  (Last 48 hours)    None        CXR Results  (Last 48 hours)    None          Medical Decision Making and ED Course   - I am the first and primary provider for this patient AND AM THE PRIMARY PROVIDER OF RECORD. - I reviewed the vital signs, available nursing notes, past medical history, past surgical history, family history and social history. - Initial assessment performed. The patients presenting problems have been discussed, and the staff are in agreement with the care plan formulated and outlined with them. I have encouraged them to ask questions as they arise throughout their visit. Vital Signs-Reviewed the patient's vital signs. No data found. Records Reviewed: Nursing Notes      ED Course:   Nonsyncopal Fall      Consultations:       Consultations:         Procedures and Critical Care         Disposition     Disposition:  Discharged    Remove if not discharged  DISCHARGE PLAN:  1. Current Discharge Medication List      CONTINUE these medications which have NOT CHANGED    Details   garlic (GARLIQUE PO) Take  by mouth. DULoxetine (CYMBALTA) 60 mg capsule Take 1 Capsule by mouth nightly. Qty: 90 Capsule, Refills: 0    Associated Diagnoses: Low back pain, unspecified back pain laterality, unspecified chronicity, unspecified whether sciatica present; Lumbosacral spondylosis without myelopathy; Lumbar neuritis; Facet arthropathy; Spondylosis without myelopathy or radiculopathy, lumbar region      MULTIVITAMIN PO Take  by mouth. 2.   Follow-up Information     Follow up With Specialties Details Why Contact Info    Nevin Carpio MD Orthopedic Surgery Schedule an appointment as soon as possible for a visit in 2 days  [address]  [phone]          3. Return to ED if worse   4.    Discharge Medication List as of 6/19/2022  8:19 PM      START taking these medications    Details   naproxen (Naprosyn) 500 mg tablet Take 1 Tablet by mouth two (2) times daily (with meals) for 10 days. , Normal, Disp-20 Tablet, R-0         CONTINUE these medications which have NOT CHANGED    Details   garlic (GARLIQUE PO) Take  by mouth., Historical Med      DULoxetine (CYMBALTA) 60 mg capsule Take 1 Capsule by mouth nightly., Normal, Disp-90 Capsule, R-0      MULTIVITAMIN PO Take  by mouth., Historical Med             Diagnosis     Clinical Impression:   1. Fall from ground level    2. Sprain of left shoulder, unspecified shoulder sprain type, initial encounter    3. Hip pain    4. Contusion of left knee, initial encounter        Attestations:    Shantel Solo MD    Please note that this dictation was completed with [employer], the doo voice recognition software. Quite often unanticipated grammatical, syntax, homophones, and other interpretive errors are inadvertently transcribed by the computer software. Please disregard these errors. Please excuse any errors that have escaped final proofreading. Thank you.

## 2022-06-19 NOTE — ED TRIAGE NOTES
Tripped over curb at [employer] around 4:15, landing on bilateral knees, abrasion to left knee. C/o left hip pain down to left foot.

## 2022-06-21 ENCOUNTER — OFFICE VISIT (OUTPATIENT)
Dept: ORTHOPEDIC SURGERY | Age: 67
End: 2022-06-21
Payer: COMMERCIAL

## 2022-06-21 DIAGNOSIS — M75.52 BURSITIS OF LEFT SHOULDER: Primary | ICD-10-CM

## 2022-06-21 DIAGNOSIS — M17.12 OSTEOARTHRITIS OF LEFT KNEE, UNSPECIFIED OSTEOARTHRITIS TYPE: ICD-10-CM

## 2022-06-21 PROCEDURE — 99213 OFFICE O/P EST LOW 20 MIN: CPT | Performed by: ORTHOPAEDIC SURGERY

## 2022-06-21 PROCEDURE — 1123F ACP DISCUSS/DSCN MKR DOCD: CPT | Performed by: ORTHOPAEDIC SURGERY

## 2022-06-21 NOTE — Clinical Note
6/24/2022    Patient: Sailaja Tran   YOB: 1955   Date of Visit: 6/21/2022     Gracie Garcia NP  Via     Dear Gracie Garcia NP,      Thank you for referring Ms. Phillip Servin to [address] [place] [address] for evaluation. My notes for this consultation are attached. If you have questions, please do not hesitate to call me. I look forward to following your patient along with you.       Sincerely,    Karis Colvin MD
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
## 2022-06-21 NOTE — PATIENT INSTRUCTIONS
Knee Arthritis: Care Instructions  Your Care Instructions     Knee arthritis is a breakdown of the cartilage that cushions your knee joint. When the cartilage wears down, your bones rub against each other. This causes pain and stiffness. Knee arthritis tends to get worse with time. Treatment for knee arthritis involves reducing pain, making the leg muscles stronger, and staying at a healthy body weight. The treatment usually does not improve the health of the cartilage, but it can reduce pain and improve how well your knee works. You can take simple measures to protect your knee joints, ease your pain, and help you stay active. Follow-up care is a key part of your treatment and safety. Be sure to make and go to all appointments, and call your doctor if you are having problems. It's also a good idea to know your test results and keep a list of the medicines you take. How can you care for yourself at home? · Know that knee arthritis will cause more pain on some days than on others. · Stay at a healthy weight. Lose weight if you are overweight. When you stand up, the pressure on your knees from every pound of body weight is multiplied four times. So if you lose 10 pounds, you will reduce the pressure on your knees by 40 pounds. · Talk to your doctor or physical therapist about exercises that will help ease joint pain. ? Stretch to help prevent stiffness and to prevent injury before you exercise. You may enjoy gentle forms of yoga to help keep your knee joints and muscles flexible. ? Walk instead of jog.  ? Ride a bike. This makes your thigh muscles stronger and takes pressure off your knee. ? Wear well-fitting and comfortable shoes. ? Exercise in chest-deep water. This can help you exercise longer with less pain. ? Avoid exercises that include squatting or kneeling. They can put a lot of strain on your knees.   ? Talk to your doctor to make sure that the exercise you do is not making the arthritis worse.  · Do not sit for long periods of time. Try to walk once in a while to keep your knee from getting stiff. · Ask your doctor or physical therapist whether shoe inserts may reduce your arthritis pain. · If you can afford it, get new athletic shoes at least every year. This can help reduce the strain on your knees. · Use a device to help you do everyday activities. ? A cane or walking stick can help you keep your balance when you walk. Hold the cane or walking stick in the hand opposite the painful knee. ? If you feel like you may fall when you walk, try using crutches or a front-wheeled walker. These can prevent falls that could cause more damage to your knee. ? A knee brace may help keep your knee stable and prevent pain. ? You also can use other things to make life easier, such as a higher toilet seat and handrails in the bathtub or shower. · Take pain medicines exactly as directed. ? Do not wait until you are in severe pain. You will get better results if you take it sooner. ? If you are not taking a prescription pain medicine, take an over-the-counter medicine such as acetaminophen (Tylenol), ibuprofen (Advil, Motrin), or naproxen (Aleve). Read and follow all instructions on the label. ? Do not take two or more pain medicines at the same time unless the doctor told you to. Many pain medicines have acetaminophen, which is Tylenol. Too much acetaminophen (Tylenol) can be harmful. ? Tell your doctor if you take a blood thinner, have diabetes, or have allergies to shellfish. · Ask your doctor if you might benefit from a shot of steroid medicine into your knee. This may provide pain relief for several months. · Many people take the supplements glucosamine and chondroitin for osteoarthritis. Some people feel they help, but the medical research does not show that they work. Talk to your doctor before you take these supplements. When should you call for help?    Call your doctor now or seek immediate medical care if:    · You have sudden swelling, warmth, or pain in your knee.     · You have knee pain and a fever or rash.     · You have such bad pain that you cannot use your knee. Watch closely for changes in your health, and be sure to contact your doctor if you have any problems. Where can you learn more? Go to http://www.gray.com/  Enter W187 in the search box to learn more about \"Knee Arthritis: Care Instructions. \"  Current as of: December 20, 2021               Content Version: 13.2  © 2006-2022 [employer]. Care instructions adapted under license by [employer] (which disclaims liability or warranty for this information). If you have questions about a medical condition or this instruction, always ask your healthcare professional. [address] any warranty or liability for your use of this information. Shoulder Bursitis: Care Instructions  Your Care Instructions     Bursitis is inflammation of the bursa. A bursa is a small sac of fluid that cushions a joint and helps it move easily. A bursa sits under the highest point of your shoulder. You can get bursitis by overusing your shoulder, which can happen with activities such as lifting, pitching a ball, or painting. Symptoms of bursitis include pain when you move your arm. Your arm may hurt when you try to lift it, and the pain can reach down the side of your arm. You may have trouble sleeping because of the pain. Bursitis usually gets better if you avoid the activity that caused it. If pain lasts or gets worse despite home treatment, your doctor may draw fluid from the bursa through a needle. This may relieve your pain and help your doctor know if you have an infection. If so, your doctor will prescribe antibiotics. If you have inflammation only, you may get a corticosteroid shot to reduce swelling and pain.  Sometimes surgery is needed to drain or remove the bursa.  Follow-up care is a key part of your treatment and safety. Be sure to make and go to all appointments, and call your doctor if you are having problems. It's also a good idea to know your test results and keep a list of the medicines you take. How can you care for yourself at home? · Put ice or a cold pack on your shoulder for 10 to 20 minutes at a time. Put a thin cloth between the ice and your skin. · After 3 days of using ice, use heat on your shoulder. You can use a hot water bottle, a heating pad set on low, or a warm, moist towel. Some doctors suggest alternating between hot and cold. · Rest your shoulder. Stop any activities that cause pain. Switch to activities that do not stress your shoulder. · Take your medicines exactly as prescribed. Call your doctor if you think you are having a problem with your medicine. · If your doctor recommends it, take anti-inflammatory medicines to reduce pain. These include ibuprofen (Advil, Motrin) and naproxen (Aleve). Read and follow all instructions on the label. · To prevent stiffness, gently move the shoulder joint through its full range of motion. As the pain gets better, keep doing range-of-motion exercises. Ask your doctor for exercises that will make the muscles around the shoulder joint stronger. Do these as directed. · You can slowly return to the activity that caused the pain, but do it with less effort until you can do it without pain or swelling. Be sure to warm up before and stretch after you do the activity. When should you call for help? Call your doctor now or seek immediate medical care if:    · You have a fever.     · You have increased swelling or redness in your shoulder.     · You cannot use your shoulder, or the pain in your shoulder gets worse. Watch closely for changes in your health, and be sure to contact your doctor if:    · You have pain for 2 weeks or longer despite home treatment. Where can you learn more?   Go to http://www.gray.com/  Enter M955 in the search box to learn more about \"Shoulder Bursitis: Care Instructions. \"  Current as of: July 1, 2021               Content Version: 13.2  © 4691-7431 [employer]. Care instructions adapted under license by [employer] (which disclaims liability or warranty for this information). If you have questions about a medical condition or this instruction, always ask your healthcare professional. [place] [address] any warranty or liability for your use of this information.

## 2022-06-21 NOTE — PROGRESS NOTES
Name: Nael Hassan    : 1955     Service Dept: [facility] and Sports Medicine    Chief Complaint   Patient presents with    Shoulder Pain        Visit Vitals  LMP  (LMP Unknown)        No Known Allergies     Current Outpatient Medications   Medication Sig Dispense Refill    naproxen (Naprosyn) 500 mg tablet Take 1 Tablet by mouth two (2) times daily (with meals) for 10 days. 20 Tablet 0    garlic (GARLIQUE PO) Take  by mouth.  DULoxetine (CYMBALTA) 60 mg capsule Take 1 Capsule by mouth nightly. 90 Capsule 0    MULTIVITAMIN PO Take  by mouth. Patient Active Problem List   Diagnosis Code    Severe obesity ([facility] [address] E66.01      History reviewed. No pertinent family history. Social History     Socioeconomic History    Marital status: SINGLE   Tobacco Use    Smoking status: Never Smoker    Smokeless tobacco: Never Used   Substance and Sexual Activity    Alcohol use: Not Currently    Drug use: Never      Past Surgical History:   Procedure Laterality Date    HX CYST REMOVAL      cyst removal on breast    HX HYSTERECTOMY      HX TONSILLECTOMY        History reviewed. No pertinent past medical history. I have reviewed and agree with [facility] and ROS and intake form in chart and the record furthermore I have reviewed prior medical record(s) regarding this patients care during this appointment. Review of Systems:   Patient is a pleasant appearing individual, appropriately dressed, well hydrated, well nourished, who is alert, appropriately oriented for age, and in no acute distress with a normal gait and normal affect who does not appear to be in any significant pain. Physical Exam:  Left Shoulder - Grossly neurovascularly intact. Range of motion-Full passive, Active with impingement. No Point tenderness, Strength-weakness with abduction, some mild crepitation, No skin lesion are identified, No instabilty is noted, No apprehension. No Swelling.      Right Shoulder - Grossly neurovascularly intact, Full Range of motion, No point tenderness, No weakness, No skin lesions, No Instability, No apprehension, No swelling. Left Knee -Decrease range of motion with flexion, Knee arc of greater than 50 degrees, Some crepitation, Grossly neurovascularly intact, Good cap refill, No skin lesion, Moderate swelling, No gross instability, Some quadriceps weakness, greater than 50 degree arc    Right Knee - Full Range of Motion, No crepitation, Grossly neurovascularly intact, Good cap refill, No skin lesion, No swelling, No gross instability, No quadriceps weakness   Encounter Diagnoses     ICD-10-CM ICD-9-CM   1. Bursitis of left shoulder  M75.52 726.10   2. Osteoarthritis of left knee, unspecified osteoarthritis type  M17.12 715.96       HPI:  The patient is here with a chief complaint of left shoulder and left knee pain. Sharp throbbing pain. She feels a little bit better, but continues to have difficulty. Pain is 7/10. ROS:  X-rays of the left shoulder and left knee are unremarkable. Assessment/Plan:  1. Left shoulder bursitis and arthritic flare. Plan will be for physical therapy for both. See the patient back in four weeks. If no better, we may consider injection and/or an MRI and go from there. As part of continued conservative pain management options the patient was advised to utilize Tylenol or OTC NSAIDS as long as it is not medically contraindicated. Return to Office: Follow-up and Dispositions    · Return in about 4 weeks (around 7/19/2022). Scribed by Yocasta Dailey LPN as dictated by [facility] AZEB Woody MD.  Documentation True and Accepted Devonte Woody MD

## 2022-06-22 ENCOUNTER — OFFICE VISIT (OUTPATIENT)
Dept: ORTHOPEDIC SURGERY | Age: 67
End: 2022-06-22
Payer: COMMERCIAL

## 2022-06-22 VITALS
TEMPERATURE: 97.9 F | OXYGEN SATURATION: 98 % | WEIGHT: 177 LBS | BODY MASS INDEX: 32.57 KG/M2 | HEART RATE: 76 BPM | HEIGHT: 62 IN

## 2022-06-22 DIAGNOSIS — M47.817 LUMBOSACRAL SPONDYLOSIS WITHOUT MYELOPATHY: ICD-10-CM

## 2022-06-22 DIAGNOSIS — E66.01 SEVERE OBESITY (HCC): ICD-10-CM

## 2022-06-22 DIAGNOSIS — M47.816 SPONDYLOSIS WITHOUT MYELOPATHY OR RADICULOPATHY, LUMBAR REGION: ICD-10-CM

## 2022-06-22 DIAGNOSIS — M47.819 FACET ARTHROPATHY: ICD-10-CM

## 2022-06-22 DIAGNOSIS — M54.50 LOW BACK PAIN, UNSPECIFIED BACK PAIN LATERALITY, UNSPECIFIED CHRONICITY, UNSPECIFIED WHETHER SCIATICA PRESENT: Primary | ICD-10-CM

## 2022-06-22 DIAGNOSIS — M54.16 LUMBAR NEURITIS: ICD-10-CM

## 2022-06-22 PROCEDURE — 1123F ACP DISCUSS/DSCN MKR DOCD: CPT | Performed by: PHYSICAL MEDICINE & REHABILITATION

## 2022-06-22 PROCEDURE — 99213 OFFICE O/P EST LOW 20 MIN: CPT | Performed by: PHYSICAL MEDICINE & REHABILITATION

## 2022-06-22 RX ORDER — DULOXETIN HYDROCHLORIDE 60 MG/1
60 CAPSULE, DELAYED RELEASE ORAL
Qty: 90 CAPSULE | Refills: 0 | Status: SHIPPED | OUTPATIENT
Start: 2022-06-22

## 2022-06-22 NOTE — Clinical Note
6/22/2022    Patient: Darius Dyson   YOB: 1955   Date of Visit: 6/22/2022     Leon Schwab NP  Via     Dear Leon Schwab NP,      Thank you for referring Ms. Chance Lacey to [address] for evaluation. My notes for this consultation are attached. If you have questions, please do not hesitate to call me. I look forward to following your patient along with you.       Sincerely,    Catherine Bush MD

## 2022-07-12 ENCOUNTER — TELEPHONE (OUTPATIENT)
Dept: ORTHOPEDIC SURGERY | Age: 67
End: 2022-07-12

## 2022-07-12 NOTE — TELEPHONE ENCOUNTER
Patient called in stating that [address] [place] [address] said that they need a new referral to schedule this patient.

## 2022-07-13 ENCOUNTER — TELEPHONE (OUTPATIENT)
Dept: NEUROLOGY | Age: 67
End: 2022-07-13

## 2022-07-13 NOTE — TELEPHONE ENCOUNTER
Pt. Called to schedule an appointment in August, but she says she is experiencing nerve bad pain in her mouth and would like the doctor to prescribe her something.

## 2022-07-14 NOTE — TELEPHONE ENCOUNTER
She would like to go next door.  By the time they get her in it would be over 30 days so that's why they need a new one please

## 2022-07-15 ENCOUNTER — TELEPHONE (OUTPATIENT)
Dept: NEUROLOGY | Age: 67
End: 2022-07-15

## 2022-07-15 NOTE — TELEPHONE ENCOUNTER
Pt. Daughter called again in regards with her mouth pain she says she can barely talk now and would like Dr. Petra Carmichael to prescribe her some pain medication until her appointment with him in August.  The pharmacy she would like the prescription sent is [facility]. Please advise.

## 2022-07-18 DIAGNOSIS — M75.52 BURSITIS OF LEFT SHOULDER: Primary | ICD-10-CM

## 2022-07-18 DIAGNOSIS — M17.12 OSTEOARTHRITIS OF LEFT KNEE, UNSPECIFIED OSTEOARTHRITIS TYPE: ICD-10-CM

## 2022-07-18 NOTE — TELEPHONE ENCOUNTER
Can you please assist.     Just need an updated referral due to it being over 30 days when they would be able to get patient scheduled.

## 2022-07-19 ENCOUNTER — OFFICE VISIT (OUTPATIENT)
Dept: ORTHOPEDIC SURGERY | Age: 67
End: 2022-07-19
Payer: COMMERCIAL

## 2022-07-19 VITALS — HEIGHT: 62 IN | BODY MASS INDEX: 32.2 KG/M2 | WEIGHT: 175 LBS

## 2022-07-19 DIAGNOSIS — M25.512 LEFT SHOULDER PAIN, UNSPECIFIED CHRONICITY: Primary | ICD-10-CM

## 2022-07-19 PROCEDURE — 99213 OFFICE O/P EST LOW 20 MIN: CPT | Performed by: ORTHOPAEDIC SURGERY

## 2022-07-19 PROCEDURE — 1123F ACP DISCUSS/DSCN MKR DOCD: CPT | Performed by: ORTHOPAEDIC SURGERY

## 2022-07-19 RX ORDER — AZELASTINE HYDROCHLORIDE 0.5 MG/ML
SOLUTION/ DROPS OPHTHALMIC
COMMUNITY
Start: 2022-06-30

## 2022-07-19 RX ORDER — OMEPRAZOLE 20 MG/1
CAPSULE, DELAYED RELEASE ORAL
COMMUNITY
Start: 2022-06-24

## 2022-07-19 NOTE — PATIENT INSTRUCTIONS
Shoulder Pain: Care Instructions  Your Care Instructions     You can hurt your shoulder by using it too much during an activity, such as fishing or baseball. It can also happen as part of the everyday wear and tear of getting older. Shoulder injuries can be slow to heal, but your shoulder should get better with time. Your doctor may recommend a sling to rest your shoulder. If you have injured your shoulder, you may need testing and treatment. Follow-up care is a key part of your treatment and safety. Be sure to make and go to all appointments, and call your doctor if you are having problems. It's also a good idea to know your test results and keep a list of the medicines you take. How can you care for yourself at home? · Take pain medicines exactly as directed. ? If the doctor gave you a prescription medicine for pain, take it as prescribed. ? If you are not taking a prescription pain medicine, ask your doctor if you can take an over-the-counter medicine. ? Do not take two or more pain medicines at the same time unless the doctor told you to. Many pain medicines contain acetaminophen, which is Tylenol. Too much acetaminophen (Tylenol) can be harmful. · If your doctor recommends that you wear a sling, use it as directed. Do not take it off before your doctor tells you to. · Put ice or a cold pack on the sore area for 10 to 20 minutes at a time. Put a thin cloth between the ice and your skin. · If there is no swelling, you can put moist heat, a heating pad, or a warm cloth on your shoulder. Some doctors suggest alternating between hot and cold. · Rest your shoulder for a few days. If your doctor recommends it, you can then begin gentle exercise of the shoulder, but do not lift anything heavy. When should you call for help? Call 911 anytime you think you may need emergency care. For example, call if:    · You have chest pain or pressure. This may occur with:  ? Sweating. ?  Shortness of breath. ? Nausea or vomiting. ? Pain that spreads from the chest to the neck, jaw, or one or both shoulders or arms. ? Dizziness or lightheadedness. ? A fast or uneven pulse. After calling 911, chew 1 adult-strength aspirin. Wait for an ambulance. Do not try to drive yourself.     · Your arm or hand is cool or pale or changes color. Call your doctor now or seek immediate medical care if:    · You have signs of infection, such as:  ? Increased pain, swelling, warmth, or redness in your shoulder. ? Red streaks leading from a place on your shoulder. ? Pus draining from an area of your shoulder. ? Swollen lymph nodes in your neck, armpits, or groin. ? A fever. Watch closely for changes in your health, and be sure to contact your doctor if:    · You cannot use your shoulder.     · Your shoulder does not get better as expected. Where can you learn more? Go to http://www.ruff.com/  Enter H996 in the search box to learn more about \"Shoulder Pain: Care Instructions. \"  Current as of: July 1, 2021               Content Version: 13.2  © 9461-5965 [employer]. Care instructions adapted under license by [employer] (which disclaims liability or warranty for this information). If you have questions about a medical condition or this instruction, always ask your healthcare professional. [place] [address] any warranty or liability for your use of this information.

## 2022-07-19 NOTE — PROGRESS NOTES
Name: Tan Torres    : 1955     Service Dept: [address] [place] [address] and Sports Medicine    Chief Complaint   Patient presents with    Shoulder Pain     LEFT        Visit Vitals  Ht 5' 2\" (1.575 m)   Wt 175 lb (79.4 kg)   LMP  (LMP Unknown)   BMI 32.01 kg/m²        No Known Allergies     Current Outpatient Medications   Medication Sig Dispense Refill    azelastine (OPTIVAR) 0.05 % ophthalmic solution INSTILL 1 DROP INTO EACH EYE TWICE DAILY AS NEEDED      omeprazole (PRILOSEC) 20 mg capsule TAKE 1 CAPSULE BY MOUTH TWICE DAILY 30 MINUTES BEFORE MORNING AND EVENING MEAL      DULoxetine (CYMBALTA) 60 mg capsule Take 1 Capsule by mouth nightly. 90 Capsule 0    garlic (GARLIQUE PO) Take  by mouth.  MULTIVITAMIN PO Take  by mouth. Patient Active Problem List   Diagnosis Code    Severe obesity ([facility] [address] E66.01      No family history on file. Social History     Socioeconomic History    Marital status: SINGLE   Tobacco Use    Smoking status: Never Smoker    Smokeless tobacco: Never Used   Substance and Sexual Activity    Alcohol use: Not Currently    Drug use: Never      Past Surgical History:   Procedure Laterality Date    HX CYST REMOVAL      cyst removal on breast    HX HYSTERECTOMY      HX TONSILLECTOMY        No past medical history on file. I have reviewed and agree with [address] [place] [address] Nw and ROS and intake form in chart and the record furthermore I have reviewed prior medical record(s) regarding this patients care during this appointment. Review of Systems:   Patient is a pleasant appearing individual, appropriately dressed, well hydrated, well nourished, who is alert, appropriately oriented for age, and in no acute distress with a normal gait and normal affect who does not appear to be in any significant pain. Physical Exam:  Right Shoulder - Positive \"empty can\" test, Grossly neurovascularly intact. Range of motion-Full passive, Active with impingement.  No Point tenderness, Strength-significant weakness noted with abduction, some mild crepitation, No skin lesion are identified, No instabilty is noted, No apprehension. No Swelling. Left Shoulder - grossly neurovascularly intact. Full Range of motion, No Weakness with abduction, No Point Tenderness, No skin lesion are identified, No instabilty is noted, No apprehension. No cuts or abrasions are identified. Encounter Diagnoses     ICD-10-CM ICD-9-CM   1. Left shoulder pain, unspecified chronicity  M25.512 719.41       HPI:  The patient is here with a chief complaint of left shoulder pain, throbbing, burning pain. It has been the same. Post injection no relief. X-rays are unremarkable. Assessment/Plan:  Plan at this point, we will get an MRI of the left shoulder, rule out rotator cuff pathology. As part of continued conservative pain management options the patient was advised to utilize Tylenol or OTC NSAIDS as long as it is not medically contraindicated. Return to Office: Follow-up and Dispositions    · Return for POST MRI. Scribed by Reginaldo Frias LPN as dictated by [facility] AZEB Cao MD.  Documentation True and Accepted Devonte Cao MD

## 2022-07-19 NOTE — Clinical Note
7/21/2022    Patient: Fatimah Dewey   YOB: 1955   Date of Visit: 7/19/2022     Michael Bangura NP  Via     Dear Michael Bangura NP,      Thank you for referring Ms. Kumar Reno to [address] [place] [address] for evaluation. My notes for this consultation are attached. If you have questions, please do not hesitate to call me. I look forward to following your patient along with you.       Sincerely,    Alejandra Vyas MD

## 2022-07-26 ENCOUNTER — TELEPHONE (OUTPATIENT)
Dept: NEUROLOGY | Age: 67
End: 2022-07-26

## 2022-07-26 DIAGNOSIS — G50.0 RIGHT TRIGEMINAL NEURALGIA: Primary | ICD-10-CM

## 2022-07-26 RX ORDER — CARBAMAZEPINE 200 MG/1
200 TABLET ORAL 3 TIMES DAILY
Qty: 90 TABLET | Refills: 1 | Status: SHIPPED | OUTPATIENT
Start: 2022-07-26 | End: 2022-08-25

## 2022-07-26 NOTE — TELEPHONE ENCOUNTER
Pt. Daughter called to see if Nick Carvalho would prescribe her medication to help with pain until her appointment. The pharmacy she wants it sent is the [facility] in [place]. Contact number: [phone]  Daughter Luz:[phone]  Please advise.

## 2022-07-28 ENCOUNTER — APPOINTMENT (OUTPATIENT)
Dept: PHYSICAL THERAPY | Age: 67
End: 2022-07-28

## 2022-08-10 ENCOUNTER — OFFICE VISIT (OUTPATIENT)
Dept: NEUROLOGY | Age: 67
End: 2022-08-10
Payer: COMMERCIAL

## 2022-08-10 VITALS
WEIGHT: 17.4 LBS | HEART RATE: 66 BPM | RESPIRATION RATE: 20 BRPM | HEIGHT: 63 IN | BODY MASS INDEX: 3.08 KG/M2 | OXYGEN SATURATION: 99 % | DIASTOLIC BLOOD PRESSURE: 76 MMHG | SYSTOLIC BLOOD PRESSURE: 122 MMHG

## 2022-08-10 DIAGNOSIS — G50.0 RIGHT TRIGEMINAL NEURALGIA: Primary | ICD-10-CM

## 2022-08-10 PROCEDURE — 1123F ACP DISCUSS/DSCN MKR DOCD: CPT | Performed by: STUDENT IN AN ORGANIZED HEALTH CARE EDUCATION/TRAINING PROGRAM

## 2022-08-10 PROCEDURE — 99214 OFFICE O/P EST MOD 30 MIN: CPT | Performed by: STUDENT IN AN ORGANIZED HEALTH CARE EDUCATION/TRAINING PROGRAM

## 2022-08-10 RX ORDER — GABAPENTIN 300 MG/1
300 CAPSULE ORAL 2 TIMES DAILY
Qty: 60 CAPSULE | Refills: 3 | Status: SHIPPED | OUTPATIENT
Start: 2022-08-10 | End: 2022-09-09

## 2022-08-10 NOTE — PROGRESS NOTES
Armani Young is a 75 y.o. female . presents for Follow-up (Right trigeminal neuralgia)   . A 75years old female patient here for follow-up of right-sided trigeminal neuralgia mainly involving the right maxillary and mandibular divisions. She was last seen in the clinic for a virtual encounter in March 2021; subsequently lost to follow-up. During the last visit, the facial pain had completely resolved and she was off any of her medications. She had an MRA which showed a contact of the right superior set labeled artery on the right trigeminal nerve. Was also seen at [facility] neurosurgery. Did not have any further follow-up. She has been free of any pain until about 2022. She claims she had a fall but did not sustain any injury to her head. She was also seen by a dentist about a month ago and had some clinic. About 3 days later, she started to have the sharp pain over the right lower face. Might be triggered by eating/talking/patient with his. Once it starts, it continues for a while. She was restarted back on the carbamazepine 200 mg p.o. 3 times daily. The medicine decreases the pain so that she can eat and talk. But still continues to have the pain over the same distribution. Do not have any numbness. No facial drooping. No extremity weakness. No balance difficulty. In addition to carbamazepine, she has previously been on gabapentin. Following at the spine center with Dr Jennifer Galan; she was given duloxetine but she discontinued it because of side effects. .      Review of Systems   Constitutional:  Negative for chills, fever and weight loss. HENT:  Positive for tinnitus (sometimes). Negative for hearing loss. Eyes:  Negative for blurred vision, double vision and pain. Gastrointestinal:  Negative for heartburn, nausea and vomiting. Genitourinary:  Negative for dysuria, frequency and urgency. Musculoskeletal:  Positive for back pain. Negative for neck pain.    Skin:  Negative for itching and rash. Neurological:  Negative for dizziness, tingling, tremors, sensory change, speech change, focal weakness, seizures and headaches. Endo/Heme/Allergies:  Does not bruise/bleed easily. Psychiatric/Behavioral:  Negative for depression. The patient is not nervous/anxious. No past medical history on file. Past Surgical History:   Procedure Laterality Date    HX CYST REMOVAL      cyst removal on breast    HX HYSTERECTOMY      HX TONSILLECTOMY          No family history on file. Social History     Socioeconomic History    Marital status: SINGLE     Spouse name: Not on file    Number of children: Not on file    Years of education: Not on file    Highest education level: Not on file   Occupational History    Not on file   Tobacco Use    Smoking status: Never    Smokeless tobacco: Never   Vaping Use    Vaping Use: Not on file   Substance and Sexual Activity    Alcohol use: Not Currently    Drug use: Never    Sexual activity: Not on file   Other Topics Concern    Not on file   Social History Narrative    Not on file     Social Determinants of Health     Financial Resource Strain: Not on file   Food Insecurity: Not on file   Transportation Needs: Not on file   Physical Activity: Not on file   Stress: Not on file   Social Connections: Not on file   Intimate Partner Violence: Not on file   Housing Stability: Not on file        No Known Allergies      Current Outpatient Medications   Medication Sig Dispense Refill    carBAMazepine (TEGretol) 200 mg tablet Take 1 Tablet by mouth three (3) times daily for 30 days. 90 Tablet 1    azelastine (OPTIVAR) 0.05 % ophthalmic solution INSTILL 1 DROP INTO EACH EYE TWICE DAILY AS NEEDED      omeprazole (PRILOSEC) 20 mg capsule TAKE 1 CAPSULE BY MOUTH TWICE DAILY 30 MINUTES BEFORE MORNING AND EVENING MEAL      DULoxetine (CYMBALTA) 60 mg capsule Take 1 Capsule by mouth nightly. 90 Capsule 0    garlic (GARLIQUE PO) Take  by mouth.       MULTIVITAMIN PO Take  by mouth.           Physical Exam  Constitutional:       Appearance: Normal appearance. HENT:      Head: Normocephalic and atraumatic. Mouth/Throat:      Mouth: Mucous membranes are moist.      Pharynx: Oropharynx is clear. No oropharyngeal exudate. Eyes:      Extraocular Movements: Extraocular movements intact. Pupils: Pupils are equal, round, and reactive to light. Pulmonary:      Effort: Pulmonary effort is normal. No respiratory distress. Musculoskeletal:         General: Normal range of motion. Cervical back: Normal range of motion and neck supple. Right lower leg: No edema. Left lower leg: No edema. Neurological:      Mental Status: She is alert. Comments: Mental status: Awake, alert, oriented , follows simple and complex commands, no neglect, no extinction. Speech and languge: fluent, coherent,  and comprehension intact  CN: VFF, EOMI, PERRLA, face sensation intact , no facial asymmetry noted, palate elevation symmetric bilat, SS+SCM 5/5 bilat, tongue midline  Motor: no pronator drift, tone normal throughout, strength 5/5 throughout  Sensory: intact to light touch and PP  throughout  Coordination: FNF accurate w/o dysmetria  DTR: 2+ throughout  Gait: Normal.             No visits with results within 3 Month(s) from this visit.    Latest known visit with results is:   Hospital Outpatient Visit on 02/15/2022   Component Date Value Ref Range Status    LV EDV A2C 02/15/2022 80  mL Final    LV EDV A4C 02/15/2022 69  mL Final    LV ESV A2C 02/15/2022 31  mL Final    LV ESV A4C 02/15/2022 26  mL Final    IVSd 02/15/2022 1.0 (A) 0.6 - 0.9 cm Final    LVIDd 02/15/2022 4.7  3.9 - 5.3 cm Final    LVIDs 02/15/2022 3.1  cm Final    LVOT Diameter 02/15/2022 2.2  cm Final    LVOT Mean Gradient 02/15/2022 2  mmHg Final    LVOT VTI 02/15/2022 17.4  cm Final    LVOT Peak Velocity 02/15/2022 0.8  m/s Final    LVOT Peak Gradient 02/15/2022 3  mmHg Final    LVPWd 02/15/2022 1.1 (A) 0.6 - 0.9 cm Final    LV E' Lateral Velocity 02/15/2022 9  cm/s Final    LV E' Septal Velocity 02/15/2022 8  cm/s Final    LV Ejection Fraction A2C 02/15/2022 61  % Final    LV Ejection Fraction A4C 02/15/2022 63  % Final    EF BP 02/15/2022 63  55 - 100 % Final    LVOT Area 02/15/2022 3.8  cm2 Final    LVOT SV 02/15/2022 66.1  ml Final    LA Minor Axis 02/15/2022 4.9  cm Final    LA Major Axis 02/15/2022 4.8  cm Final    LA Area 2C 02/15/2022 17.5  cm2 Final    LA Area 4C 02/15/2022 16.9  cm2 Final    LA Volume BP 02/15/2022 50  22 - 52 mL Final    LA Diameter 02/15/2022 3.8  cm Final    RA Area 4C 02/15/2022 13.1  cm2 Final    AV Mean Gradient 02/15/2022 4  mmHg Final    AV VTI 02/15/2022 30.3  cm Final    AV Mean Velocity 02/15/2022 0.9  m/s Final    AV Peak Velocity 02/15/2022 1.4  m/s Final    AV Peak Gradient 02/15/2022 8  mmHg Final    AV Area by VTI 02/15/2022 2.2  cm2 Final    AV Area by Peak Velocity 02/15/2022 2.3  cm2 Final    Aortic Root 02/15/2022 3.2  cm Final    Ascending Aorta 02/15/2022 3.1  cm Final    Main Pulmonary Artery Diameter 02/15/2022 2.2  cm Final    MV E Wave Deceleration Time 02/15/2022 311.0  ms Final    MV A Velocity 02/15/2022 0.80  m/s Final    MV E Velocity 02/15/2022 0.64  m/s Final    MV Mean Gradient 02/15/2022 1  mmHg Final    MV VTI 02/15/2022 26.2  cm Final    MV Mean Velocity 02/15/2022 0.5  m/s Final    MV Max Velocity 02/15/2022 0.8  m/s Final    MV Peak Gradient 02/15/2022 3  mmHg Final    MV Area by VTI 02/15/2022 2.5  cm2 Final    AZ Max Velocity 02/15/2022 0.9  m/s Final    Pulmonary Artery EDP 02/15/2022 3  mmHg Final    PV Max Velocity 02/15/2022 1.6  m/s Final    Pulmonary Artery EDP 02/15/2022 10  mmHg Final    PV Max Velocity 02/15/2022 1.1  m/s Final    PV Peak Gradient 02/15/2022 5  mmHg Final    RVOT Diameter 02/15/2022 2.0  cm Final    RVOT Area 02/15/2022 3.1  cm2 Final    TAPSE 02/15/2022 2.3 (A) 1.5 - 2.0 cm Final    TR Max Velocity 02/15/2022 2.61  m/s Final    TR Peak Gradient 02/15/2022 27  mmHg Final    Fractional Shortening 2D 02/15/2022 34  28 - 44 % Final    LV ESV Index A4C 02/15/2022 14  mL/m2 Final    LV EDV Index A4C 02/15/2022 38  mL/m2 Final    LV ESV Index A2C 02/15/2022 17  mL/m2 Final    LV EDV Index A2C 02/15/2022 44  mL/m2 Final    LVIDd Index 02/15/2022 2.57  cm/m2 Final    LVIDs Index 02/15/2022 1.69  cm/m2 Final    LV RWT Ratio 02/15/2022 0.47   Final    LV Mass 2D 02/15/2022 175.8 (A) 67 - 162 g Final    LV Mass 2D Index 02/15/2022 96.1 (A) 43 - 95 g/m2 Final    MV E/A 02/15/2022 0.80   Final    E/E' Ratio (Averaged) 02/15/2022 7.56   Final    E/E' Lateral 02/15/2022 7.11   Final    E/E' Septal 02/15/2022 8.00   Final    LA Volume Index BP 02/15/2022 27  16 - 34 ml/m2 Final    LVOT Stroke Volume Index 02/15/2022 36.1  mL/m2 Final    LA Size Index 02/15/2022 2.08  cm/m2 Final    LA/AO Root Ratio 02/15/2022 1.19   Final    Ao Root Index 02/15/2022 1.75  cm/m2 Final    Ascending Aorta Index 02/15/2022 1.69  cm/m2 Final    AV Velocity Ratio 02/15/2022 0.57   Final    LVOT:AV VTI Index 02/15/2022 0.57   Final    SHEREEN/BSA VTI 02/15/2022 1.2  cm2/m2 Final    SHEREEN/BSA Peak Velocity 02/15/2022 1.3  cm2/m2 Final    MV:LVOT VTI Index 02/15/2022 1.51   Final    Est. RA Pressure 02/15/2022 3  mmHg Final    RVSP 02/15/2022 30  mmHg Final       Study Result    Narrative & Impression   EXAM: MRA BRAIN WO CONT     CLINICAL INDICATIONS/HISTORY: Evaluation trigeminal neuralgia. COMPARISON: None     TECHNIQUE: The [place] vasculature was assessed with axial 3D TOF  source images from near foramen magnum up to sylvian region, appropriately  reformatted. FINDINGS:     Right Anterior Circulation-  Right ICA: Unremarkable  Right MCA: Unremarkable  Right ALEIDA: Unremarkable     ACOM: Unremarkable.      Left Anterior Circulation-  Left ICA: Unremarkable  Left MCA: Unremarkable  Left ALEIDA: Unremarkable     PCOM: Patent bilaterally, left larger than right     Posterior circulation-  Vertebral arteries: Patent. There is tortuosity particularly of the right  intradural vertebral artery, crossing the midline to the left. Basilar: Widely patent.  -The right superior cerebellar artery is larger than left. It does take a more  caudal course then the left side; better demonstrated on the separate  complimentary brain MRI, it does contact the cisternal segment of the right  trigeminal nerve. Branches: Bilateral PCA are patent. IMPRESSION  IMPRESSION:     1. Posterior circulation tortuosity, with contact of right superior cerebellar  artery on the right trigeminal nerve  -No aneurysm or vascular malformation  -Anterior circulation unremarkable     2. See separate brain MRI        Study Result    Narrative & Impression   EXAM: MRI BRAIN W WO CONT     CLINICAL INDICATION/HISTORY: Lower right facial pain, described as sharp  shooting type character, area of mandibular division of trigeminal nerve;  possible trigeminal neuralgia as etiology     TECHNIQUE: Multisequence multiplanar MR imaging acquired through the brain. Includes thin section skull base/cranial nerve and facial imaging inclusive of  trigeminal nerves. Contrast used: 17 cc Dotarem     COMPARISON:     FINDINGS: Moderately degraded by patient motion artifact despite repeated  sequences. Within limitations:     Parenchyma: Brain parenchyma unremarkable. Diffusion imaging normal. No acute  infarction. No acute hemorrhage. No mass lesion. No pathologic enhancement. No mass or fluid collection along the course of the trigeminal nerves, with  attention to the right. No inflammation or asymmetric thickening with attention  to the right trigeminal nerve.      CSF spaces: Ventricles and cisterns remain midline in position     IAC regions: Unremarkable     Parasellar region: Unremarkable     Vasculature: Patent flow voids within the major skull base vasculature.  -The right superior cerebellar artery does distort more caudad relative to left,  and does contact the cisternal segment of the right trigeminal nerve. The nerve  does reside in a slightly more inferolateral configuration compared with the  contralateral side abutting the petrous temporal bone just above the porus  acousticus. Reference thin axial series 13 images 253-to 71, thin coronal T2  series 10 images 9-12 (coronal image 10 in particular), and sagittal  reconstructed series 1300 images . There is no abnormally increased signal  at the root entry zone of the trigeminal nerve within the ramiro. Cervicomedullary junction: Patent     Orbits: Unremarkable     Paranasal sinuses: Slightly lobulated 3 cm mucous retention cyst within the  right maxillary sinus           IMPRESSION  IMPRESSION:     1. There is contact by looping right superior cerebellar artery along the  cisternal segment of the right trigeminal nerve. Nonspecific, but a conceivable  source of trigeminal neuralgia  -There is no mass, fluid collection, or inflammation along the trigeminal nerve     2. Brain parenchyma unremarkable. No hemorrhage or infarct. ICD-10-CM ICD-9-CM    1. Right trigeminal neuralgia  G50.0 350.1 gabapentin (NEURONTIN) 300 mg capsule          A 75years old female patient here for follow-up of trigeminal neuralgia. Has been symptom-free for 1-1/2 years. Recently The symptoms about a month ago. Right maxillary and mandibular divisions predominantly well but. Has pain every day. Restarted her carbamazepine and currently taking 200 mg p.o. 3 times daily; it eases the pain but it does not completely go away. Previously, has also been on gabapentin. We will start her back on it again 300 mg p.o. twice daily. Used to see neurosurgery previously; after the pain subsided she stopped her follow-up. Advised her to call their office if the pain does not go away. We will see her again in 3 months time. Attending Only

## 2022-08-15 ENCOUNTER — TELEPHONE (OUTPATIENT)
Dept: NEUROLOGY | Age: 67
End: 2022-08-15

## 2022-08-15 NOTE — TELEPHONE ENCOUNTER
Patient daughter called and stated that the gabapentin and tegretol makes her sleepy and with her working it is interfering with her at work and wants to know if there is something else she can try; please advise

## 2022-08-16 NOTE — TELEPHONE ENCOUNTER
Returned call to patient's daughter. Made aware of provider's comments and recommendation for medication dosing.

## 2022-09-02 DIAGNOSIS — M25.512 LEFT SHOULDER PAIN, UNSPECIFIED CHRONICITY: ICD-10-CM

## 2022-10-10 ENCOUNTER — TELEPHONE (OUTPATIENT)
Dept: NEUROLOGY | Age: 67
End: 2022-10-10

## 2022-10-21 ENCOUNTER — OFFICE VISIT (OUTPATIENT)
Dept: ORTHOPEDIC SURGERY | Age: 67
End: 2022-10-21
Payer: COMMERCIAL

## 2022-10-21 VITALS
TEMPERATURE: 97.8 F | WEIGHT: 180 LBS | BODY MASS INDEX: 33.13 KG/M2 | HEART RATE: 70 BPM | HEIGHT: 62 IN | OXYGEN SATURATION: 99 %

## 2022-10-21 DIAGNOSIS — M54.50 LOW BACK PAIN, UNSPECIFIED BACK PAIN LATERALITY, UNSPECIFIED CHRONICITY, UNSPECIFIED WHETHER SCIATICA PRESENT: Primary | ICD-10-CM

## 2022-10-21 DIAGNOSIS — M47.816 SPONDYLOSIS WITHOUT MYELOPATHY OR RADICULOPATHY, LUMBAR REGION: ICD-10-CM

## 2022-10-21 DIAGNOSIS — M54.16 LUMBAR NEURITIS: ICD-10-CM

## 2022-10-21 DIAGNOSIS — M47.817 LUMBOSACRAL SPONDYLOSIS WITHOUT MYELOPATHY: ICD-10-CM

## 2022-10-21 PROCEDURE — 1123F ACP DISCUSS/DSCN MKR DOCD: CPT | Performed by: PHYSICAL MEDICINE & REHABILITATION

## 2022-10-21 PROCEDURE — 99213 OFFICE O/P EST LOW 20 MIN: CPT | Performed by: PHYSICAL MEDICINE & REHABILITATION

## 2022-10-21 RX ORDER — CARBAMAZEPINE 200 MG/1
200 TABLET ORAL 3 TIMES DAILY
COMMUNITY
Start: 2022-09-13

## 2022-10-21 RX ORDER — GABAPENTIN 300 MG/1
CAPSULE ORAL
COMMUNITY
Start: 2022-09-13

## 2022-10-21 RX ORDER — DULOXETIN HYDROCHLORIDE 30 MG/1
30 CAPSULE, DELAYED RELEASE ORAL DAILY
Qty: 30 CAPSULE | Refills: 0 | Status: SHIPPED | OUTPATIENT
Start: 2022-10-21
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
## 2022-11-09 ENCOUNTER — OFFICE VISIT (OUTPATIENT)
Dept: NEUROLOGY | Age: 67
End: 2022-11-09
Payer: COMMERCIAL

## 2022-11-09 VITALS
OXYGEN SATURATION: 100 % | WEIGHT: 177.4 LBS | SYSTOLIC BLOOD PRESSURE: 118 MMHG | BODY MASS INDEX: 32.65 KG/M2 | HEART RATE: 63 BPM | DIASTOLIC BLOOD PRESSURE: 72 MMHG | HEIGHT: 62 IN | RESPIRATION RATE: 18 BRPM

## 2022-11-09 DIAGNOSIS — G50.0 RIGHT TRIGEMINAL NEURALGIA: ICD-10-CM

## 2022-11-09 DIAGNOSIS — G50.0 RIGHT TRIGEMINAL NEURALGIA: Primary | ICD-10-CM

## 2022-11-09 PROCEDURE — 1123F ACP DISCUSS/DSCN MKR DOCD: CPT | Performed by: STUDENT IN AN ORGANIZED HEALTH CARE EDUCATION/TRAINING PROGRAM

## 2022-11-09 PROCEDURE — 99214 OFFICE O/P EST MOD 30 MIN: CPT | Performed by: STUDENT IN AN ORGANIZED HEALTH CARE EDUCATION/TRAINING PROGRAM

## 2022-11-09 NOTE — PROGRESS NOTES
Maued Rush is a 75 y.o. female . presents for No chief complaint on file. A 76-year-old female patient here for follow-up of right trigeminal neuralgia. Last in the clinic in August 2022. She was continued with carbamazepine 200 mg p.o. 3 times daily; gabapentin 600 mg p.o. twice daily was started. The pain completely resolved and she spoke with medications; not taking any medications for the past 1 month she does not have any pain currently. Was seen by neurosurgery. At this time, do not want to have the surgery without having an updated MRI. From previous encounter  A 75years old female patient here for follow-up of right-sided trigeminal neuralgia mainly involving the right maxillary and mandibular divisions. She was last seen in the clinic for a virtual encounter in March 2021; subsequently lost to follow-up. During the last visit, the facial pain had completely resolved and she was off any of her medications. She had an MRA which showed a contact of the right superior set labeled artery on the right trigeminal nerve. Was also seen at [facility] neurosurgery. Did not have any further follow-up. She has been free of any pain until about 2022. She claims she had a fall but did not sustain any injury to her head. She was also seen by a dentist about a month ago and had some clinic. About 3 days later, she started to have the sharp pain over the right lower face. Might be triggered by eating/talking/patient with his. Once it starts, it continues for a while. She was restarted back on the carbamazepine 200 mg p.o. 3 times daily. The medicine decreases the pain so that she can eat and talk. But still continues to have the pain over the same distribution. Do not have any numbness. No facial drooping. No extremity weakness. No balance difficulty. In addition to carbamazepine, she has previously been on gabapentin.   Following at the spine center with Dr Bailey Wynne; she was given duloxetine but she discontinued it because of side effects. .      Review of Systems   Constitutional:  Negative for chills, fever and weight loss. HENT:  Positive for tinnitus (sometimes). Negative for hearing loss. Eyes:  Negative for blurred vision and double vision. Respiratory:  Positive for cough (sometiems) and sputum production. Negative for hemoptysis and shortness of breath. Cardiovascular:  Negative for chest pain and leg swelling. Gastrointestinal:  Negative for nausea and vomiting. Genitourinary:  Negative for dysuria, frequency and urgency. Musculoskeletal:  Positive for back pain. Negative for neck pain. Skin:  Negative for itching and rash. Neurological:  Positive for dizziness (sometimes). Negative for tingling, tremors, sensory change, speech change, focal weakness, seizures and headaches (right trigemenial neuralgia). Endo/Heme/Allergies:  Does not bruise/bleed easily. No past medical history on file. Past Surgical History:   Procedure Laterality Date    HX CYST REMOVAL      cyst removal on breast    HX HYSTERECTOMY      HX TONSILLECTOMY          No family history on file.      Social History     Socioeconomic History    Marital status: SINGLE     Spouse name: Not on file    Number of children: Not on file    Years of education: Not on file    Highest education level: Not on file   Occupational History    Not on file   Tobacco Use    Smoking status: Never    Smokeless tobacco: Never   Vaping Use    Vaping Use: Not on file   Substance and Sexual Activity    Alcohol use: Not Currently    Drug use: Never    Sexual activity: Not on file   Other Topics Concern    Not on file   Social History Narrative    Not on file     Social Determinants of Health     Financial Resource Strain: Not on file   Food Insecurity: Not on file   Transportation Needs: Not on file   Physical Activity: Not on file   Stress: Not on file   Social Connections: Not on file   Intimate Partner Violence: Not on file   Housing Stability: Not on file        No Known Allergies      Current Outpatient Medications   Medication Sig Dispense Refill    omeprazole (PRILOSEC) 20 mg capsule TAKE 1 CAPSULE BY MOUTH TWICE DAILY 30 MINUTES BEFORE MORNING AND EVENING MEAL      garlic (GARLIQUE PO) Take  by mouth. MULTIVITAMIN PO Take  by mouth. Physical Exam  Constitutional:       Appearance: Normal appearance. HENT:      Head: Normocephalic and atraumatic. Mouth/Throat:      Mouth: Mucous membranes are moist.      Pharynx: Oropharynx is clear. No oropharyngeal exudate. Eyes:      Extraocular Movements: Extraocular movements intact. Pupils: Pupils are equal, round, and reactive to light. Pulmonary:      Effort: Pulmonary effort is normal. No respiratory distress. Musculoskeletal:         General: Normal range of motion. Cervical back: Normal range of motion and neck supple. Right lower leg: No edema. Left lower leg: No edema. Neurological:      Mental Status: She is alert. Comments: Mental status: Awake, alert, oriented , follows simple and complex commands, no neglect, no extinction. Speech and languge: fluent, coherent,  and comprehension intact  CN: VFF, EOMI, PERRLA, face sensation intact , no facial asymmetry noted, palate elevation symmetric bilat, SS+SCM 5/5 bilat, tongue midline  Motor: no pronator drift, tone normal throughout, strength 5/5 throughout  Sensory: intact to light touch and PP  throughout  Coordination: FNF accurate w/o dysmetria  DTR: 2+ throughout  Gait: Normal.         No visits with results within 3 Month(s) from this visit.    Latest known visit with results is:   Hospital Outpatient Visit on 02/15/2022   Component Date Value Ref Range Status    LV EDV A2C 02/15/2022 80  mL Final    LV EDV A4C 02/15/2022 69  mL Final    LV ESV A2C 02/15/2022 31  mL Final    LV ESV A4C 02/15/2022 26  mL Final    IVSd 02/15/2022 1.0 (A)  0.6 - 0.9 cm Final    LVIDd 02/15/2022 4.7  3.9 - 5.3 cm Final    LVIDs 02/15/2022 3.1  cm Final    LVOT Diameter 02/15/2022 2.2  cm Final    LVOT Mean Gradient 02/15/2022 2  mmHg Final    LVOT VTI 02/15/2022 17.4  cm Final    LVOT Peak Velocity 02/15/2022 0.8  m/s Final    LVOT Peak Gradient 02/15/2022 3  mmHg Final    LVPWd 02/15/2022 1.1 (A)  0.6 - 0.9 cm Final    LV E' Lateral Velocity 02/15/2022 9  cm/s Final    LV E' Septal Velocity 02/15/2022 8  cm/s Final    LV Ejection Fraction A2C 02/15/2022 61  % Final    LV Ejection Fraction A4C 02/15/2022 63  % Final    EF BP 02/15/2022 63  55 - 100 % Final    LVOT Area 02/15/2022 3.8  cm2 Final    LVOT SV 02/15/2022 66.1  ml Final    LA Minor Axis 02/15/2022 4.9  cm Final    LA Major Axis 02/15/2022 4.8  cm Final    LA Area 2C 02/15/2022 17.5  cm2 Final    LA Area 4C 02/15/2022 16.9  cm2 Final    LA Volume BP 02/15/2022 50  22 - 52 mL Final    LA Diameter 02/15/2022 3.8  cm Final    RA Area 4C 02/15/2022 13.1  cm2 Final    AV Mean Gradient 02/15/2022 4  mmHg Final    AV VTI 02/15/2022 30.3  cm Final    AV Mean Velocity 02/15/2022 0.9  m/s Final    AV Peak Velocity 02/15/2022 1.4  m/s Final    AV Peak Gradient 02/15/2022 8  mmHg Final    AV Area by VTI 02/15/2022 2.2  cm2 Final    AV Area by Peak Velocity 02/15/2022 2.3  cm2 Final    Aortic Root 02/15/2022 3.2  cm Final    Ascending Aorta 02/15/2022 3.1  cm Final    Main Pulmonary Artery Diameter 02/15/2022 2.2  cm Final    MV E Wave Deceleration Time 02/15/2022 311.0  ms Final    MV A Velocity 02/15/2022 0.80  m/s Final    MV E Velocity 02/15/2022 0.64  m/s Final    MV Mean Gradient 02/15/2022 1  mmHg Final    MV VTI 02/15/2022 26.2  cm Final    MV Mean Velocity 02/15/2022 0.5  m/s Final    MV Max Velocity 02/15/2022 0.8  m/s Final    MV Peak Gradient 02/15/2022 3  mmHg Final    MV Area by VTI 02/15/2022 2.5  cm2 Final    RI Max Velocity 02/15/2022 0.9  m/s Final    Pulmonary Artery EDP 02/15/2022 3  mmHg Final    PV Max Velocity 02/15/2022 1.6 m/s Final    Pulmonary Artery EDP 02/15/2022 10  mmHg Final    PV Max Velocity 02/15/2022 1.1  m/s Final    PV Peak Gradient 02/15/2022 5  mmHg Final    RVOT Diameter 02/15/2022 2.0  cm Final    RVOT Area 02/15/2022 3.1  cm2 Final    TAPSE 02/15/2022 2.3 (A)  1.5 - 2.0 cm Final    TR Max Velocity 02/15/2022 2.61  m/s Final    TR Peak Gradient 02/15/2022 27  mmHg Final    Fractional Shortening 2D 02/15/2022 34  28 - 44 % Final    LV ESV Index A4C 02/15/2022 14  mL/m2 Final    LV EDV Index A4C 02/15/2022 38  mL/m2 Final    LV ESV Index A2C 02/15/2022 17  mL/m2 Final    LV EDV Index A2C 02/15/2022 44  mL/m2 Final    LVIDd Index 02/15/2022 2.57  cm/m2 Final    LVIDs Index 02/15/2022 1.69  cm/m2 Final    LV RWT Ratio 02/15/2022 0.47   Final    LV Mass 2D 02/15/2022 175.8 (A)  67 - 162 g Final    LV Mass 2D Index 02/15/2022 96.1 (A)  43 - 95 g/m2 Final    MV E/A 02/15/2022 0.80   Final    E/E' Ratio (Averaged) 02/15/2022 7.56   Final    E/E' Lateral 02/15/2022 7.11   Final    E/E' Septal 02/15/2022 8.00   Final    LA Volume Index BP 02/15/2022 27  16 - 34 ml/m2 Final    LVOT Stroke Volume Index 02/15/2022 36.1  mL/m2 Final    LA Size Index 02/15/2022 2.08  cm/m2 Final    LA/AO Root Ratio 02/15/2022 1.19   Final    Ao Root Index 02/15/2022 1.75  cm/m2 Final    Ascending Aorta Index 02/15/2022 1.69  cm/m2 Final    AV Velocity Ratio 02/15/2022 0.57   Final    LVOT:AV VTI Index 02/15/2022 0.57   Final    SHEREEN/BSA VTI 02/15/2022 1.2  cm2/m2 Final    SHEREEN/BSA Peak Velocity 02/15/2022 1.3  cm2/m2 Final    MV:LVOT VTI Index 02/15/2022 1.51   Final    Est. RA Pressure 02/15/2022 3  mmHg Final    RVSP 02/15/2022 30  mmHg Final       Study Result    Narrative & Impression   EXAM: MRA BRAIN WO CONT     CLINICAL INDICATIONS/HISTORY: Evaluation trigeminal neuralgia.      COMPARISON: None     TECHNIQUE: The [place] vasculature was assessed with axial 3D TOF  source images from near foramen magnum up to sylvian region, appropriately  reformatted. FINDINGS:     Right Anterior Circulation-  Right ICA: Unremarkable  Right MCA: Unremarkable  Right ALEIDA: Unremarkable     ACOM: Unremarkable. Left Anterior Circulation-  Left ICA: Unremarkable  Left MCA: Unremarkable  Left ALEIDA: Unremarkable     PCOM: Patent bilaterally, left larger than right     Posterior circulation-  Vertebral arteries: Patent. There is tortuosity particularly of the right  intradural vertebral artery, crossing the midline to the left. Basilar: Widely patent.  -The right superior cerebellar artery is larger than left. It does take a more  caudal course then the left side; better demonstrated on the separate  complimentary brain MRI, it does contact the cisternal segment of the right  trigeminal nerve. Branches: Bilateral PCA are patent. IMPRESSION  IMPRESSION:     1. Posterior circulation tortuosity, with contact of right superior cerebellar  artery on the right trigeminal nerve  -No aneurysm or vascular malformation  -Anterior circulation unremarkable     2. See separate brain MRI        Study Result    Narrative & Impression   EXAM: MRI BRAIN W WO CONT     CLINICAL INDICATION/HISTORY: Lower right facial pain, described as sharp  shooting type character, area of mandibular division of trigeminal nerve;  possible trigeminal neuralgia as etiology     TECHNIQUE: Multisequence multiplanar MR imaging acquired through the brain. Includes thin section skull base/cranial nerve and facial imaging inclusive of  trigeminal nerves. Contrast used: 17 cc Dotarem     COMPARISON:     FINDINGS: Moderately degraded by patient motion artifact despite repeated  sequences. Within limitations:     Parenchyma: Brain parenchyma unremarkable. Diffusion imaging normal. No acute  infarction. No acute hemorrhage. No mass lesion. No pathologic enhancement. No mass or fluid collection along the course of the trigeminal nerves, with  attention to the right.  No inflammation or asymmetric thickening with attention  to the right trigeminal nerve. CSF spaces: Ventricles and cisterns remain midline in position     IAC regions: Unremarkable     Parasellar region: Unremarkable     Vasculature: Patent flow voids within the major skull base vasculature.  -The right superior cerebellar artery does distort more caudad relative to left,  and does contact the cisternal segment of the right trigeminal nerve. The nerve  does reside in a slightly more inferolateral configuration compared with the  contralateral side abutting the petrous temporal bone just above the porus  acousticus. Reference thin axial series 13 images 253-to 71, thin coronal T2  series 10 images 9-12 (coronal image 10 in particular), and sagittal  reconstructed series 1300 images . There is no abnormally increased signal  at the root entry zone of the trigeminal nerve within the ramiro. Cervicomedullary junction: Patent     Orbits: Unremarkable     Paranasal sinuses: Slightly lobulated 3 cm mucous retention cyst within the  right maxillary sinus           IMPRESSION  IMPRESSION:     1. There is contact by looping right superior cerebellar artery along the  cisternal segment of the right trigeminal nerve. Nonspecific, but a conceivable  source of trigeminal neuralgia  -There is no mass, fluid collection, or inflammation along the trigeminal nerve     2. Brain parenchyma unremarkable. No hemorrhage or infarct. ICD-10-CM ICD-9-CM    1. Right trigeminal neuralgia  G50.0 350.1 MRI BRAIN W WO CONT      MRA BRAIN WO CONT        A 75years old female patient with recurrent right sided trigeminal neuralgia involving the mandible and maxillary divisions here for follow-up. Last seen August 2022. Was on carbamazepine 200 mg p.o. 3 times daily and gabapentin 300 mg p.o. twice daily. The pain completely resolved and she stopped taking both medications. No pain over the past 1 month.   Has seen vascular surgery. Does not want to go into surgery without having any open MRI. Her previous MRI and MRA of the brain have shown compression of the right superior cerebellar artery on the right trigeminal nerve. We will get MRI and MRA of the brain with and without contrast.  We will follow her in 4 months time but will call her with the MRI result.

## 2022-12-27 NOTE — TELEPHONE ENCOUNTER
, we were suppose to increase her to 60mg 1 month after but she CX her appt.  She should have run out on 11/21

## 2022-12-27 NOTE — TELEPHONE ENCOUNTER
Pt called to schedule follow up and request med refill until appt (2/1/23)    Rx to be sent to [facility] in [place], [place]    DULoxetine (CYMBALTA) 30 mg

## 2022-12-28 NOTE — TELEPHONE ENCOUNTER
Connie Miller MD  to Me    NP    11:23 AM  Should have run out over a month ago. Just now calling to request a refill. History of multiple cancellations. Can address at follow up. Patient states she has some left because she D/C the meloxicam when she started seeing Dr. Carlos Enrique Finch for [facility] Neuralgia which he prescribed gabapentin and carbamezepine.

## 2022-12-29 NOTE — TELEPHONE ENCOUNTER
She states she still has some left because she did not finish them when she was prescribed the other medication

## 2023-01-03 RX ORDER — DULOXETIN HYDROCHLORIDE 30 MG/1
30 CAPSULE, DELAYED RELEASE ORAL DAILY
Qty: 30 CAPSULE | Refills: 0 | Status: SHIPPED | OUTPATIENT
Start: 2023-01-03

## 2023-01-03 NOTE — TELEPHONE ENCOUNTER
Avtar Wright MD  to Me    NP    11:47 AM  Makes no sense, please confirm that she takes them daily and not prn. I will rx 30 tabs, no refills. If she still has pills, then this should carry her until her follow up appointment.   No additional refills will be given until seen     RX pended follow up scheduled 02/01/23

## 2023-01-20 ENCOUNTER — HOSPITAL ENCOUNTER (OUTPATIENT)
Age: 68
End: 2023-01-20
Attending: STUDENT IN AN ORGANIZED HEALTH CARE EDUCATION/TRAINING PROGRAM
Payer: MEDICARE

## 2023-01-20 LAB — CREAT UR-MCNC: 0.7 MG/DL (ref 0.6–1.3)

## 2023-01-20 PROCEDURE — A9575 INJ GADOTERATE MEGLUMI 0.1ML: HCPCS | Performed by: STUDENT IN AN ORGANIZED HEALTH CARE EDUCATION/TRAINING PROGRAM

## 2023-01-20 PROCEDURE — 70544 MR ANGIOGRAPHY HEAD W/O DYE: CPT

## 2023-01-20 PROCEDURE — 70553 MRI BRAIN STEM W/O & W/DYE: CPT

## 2023-01-20 PROCEDURE — 74011250636 HC RX REV CODE- 250/636: Performed by: STUDENT IN AN ORGANIZED HEALTH CARE EDUCATION/TRAINING PROGRAM

## 2023-01-20 PROCEDURE — 82565 ASSAY OF CREATININE: CPT

## 2023-01-20 RX ADMIN — GADOTERATE MEGLUMINE 15 ML: 376.9 INJECTION INTRAVENOUS at 13:50

## 2023-02-17 ENCOUNTER — OFFICE VISIT (OUTPATIENT)
Age: 68
End: 2023-02-17
Payer: MEDICAID

## 2023-02-17 VITALS
WEIGHT: 172 LBS | RESPIRATION RATE: 18 BRPM | BODY MASS INDEX: 31.65 KG/M2 | HEIGHT: 62 IN | OXYGEN SATURATION: 98 % | TEMPERATURE: 97.2 F | HEART RATE: 69 BPM

## 2023-02-17 DIAGNOSIS — M47.816 SPONDYLOSIS WITHOUT MYELOPATHY OR RADICULOPATHY, LUMBAR REGION: Primary | ICD-10-CM

## 2023-02-17 DIAGNOSIS — M54.16 LUMBAR NEURITIS: ICD-10-CM

## 2023-02-17 DIAGNOSIS — M51.36 DDD (DEGENERATIVE DISC DISEASE), LUMBAR: ICD-10-CM

## 2023-02-17 PROCEDURE — 99213 OFFICE O/P EST LOW 20 MIN: CPT | Performed by: PHYSICAL MEDICINE & REHABILITATION

## 2023-02-17 PROCEDURE — 1123F ACP DISCUSS/DSCN MKR DOCD: CPT | Performed by: PHYSICAL MEDICINE & REHABILITATION

## 2023-02-17 RX ORDER — DULOXETIN HYDROCHLORIDE 30 MG/1
30 CAPSULE, DELAYED RELEASE ORAL
Qty: 30 CAPSULE | Refills: 0 | Status: SHIPPED | OUTPATIENT
Start: 2023-02-17 | End: 2023-03-19

## 2023-02-17 RX ORDER — CARBAMAZEPINE 200 MG/1
200 TABLET ORAL 3 TIMES DAILY
COMMUNITY

## 2023-02-17 RX ORDER — GABAPENTIN 300 MG/1
300 CAPSULE ORAL 3 TIMES DAILY
COMMUNITY

## 2023-02-17 RX ORDER — DULOXETIN HYDROCHLORIDE 30 MG/1
CAPSULE, DELAYED RELEASE ORAL
COMMUNITY
Start: 2023-01-03

## 2023-02-17 RX ORDER — DULOXETIN HYDROCHLORIDE 60 MG/1
60 CAPSULE, DELAYED RELEASE ORAL
Qty: 30 CAPSULE | Refills: 1 | Status: SHIPPED | OUTPATIENT
Start: 2023-03-19

## 2023-02-17 ASSESSMENT — PATIENT HEALTH QUESTIONNAIRE - PHQ9
SUM OF ALL RESPONSES TO PHQ QUESTIONS 1-9: 0
1. LITTLE INTEREST OR PLEASURE IN DOING THINGS: 0
SUM OF ALL RESPONSES TO PHQ QUESTIONS 1-9: 0
SUM OF ALL RESPONSES TO PHQ QUESTIONS 1-9: 0
2. FEELING DOWN, DEPRESSED OR HOPELESS: 0
SUM OF ALL RESPONSES TO PHQ QUESTIONS 1-9: 0
SUM OF ALL RESPONSES TO PHQ9 QUESTIONS 1 & 2: 0
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
## 2023-03-07 ENCOUNTER — TELEPHONE (OUTPATIENT)
Age: 68
End: 2023-03-07

## 2023-04-05 ENCOUNTER — TELEPHONE (OUTPATIENT)
Age: 68
End: 2023-04-05

## 2023-04-06 DIAGNOSIS — G50.0 TRIGEMINAL NEURALGIA: Primary | ICD-10-CM

## 2023-04-06 RX ORDER — CARBAMAZEPINE 200 MG/1
200 TABLET ORAL 3 TIMES DAILY
Qty: 90 TABLET | Refills: 0 | Status: SHIPPED | OUTPATIENT
Start: 2023-04-06 | End: 2023-05-06

## 2023-04-06 RX ORDER — GABAPENTIN 300 MG/1
300 CAPSULE ORAL 3 TIMES DAILY
Qty: 90 CAPSULE | Refills: 2 | Status: SHIPPED | OUTPATIENT
Start: 2023-04-06 | End: 2023-05-06

## 2023-04-11 ENCOUNTER — TELEPHONE (OUTPATIENT)
Age: 68
End: 2023-04-11

## 2023-05-10 ENCOUNTER — OFFICE VISIT (OUTPATIENT)
Age: 68
End: 2023-05-10
Payer: MEDICAID

## 2023-05-10 VITALS
BODY MASS INDEX: 31.58 KG/M2 | HEART RATE: 73 BPM | HEIGHT: 62 IN | OXYGEN SATURATION: 95 % | TEMPERATURE: 97.9 F | WEIGHT: 171.6 LBS

## 2023-05-10 DIAGNOSIS — M54.16 LUMBAR NEURITIS: Primary | ICD-10-CM

## 2023-05-10 DIAGNOSIS — M51.36 DDD (DEGENERATIVE DISC DISEASE), LUMBAR: ICD-10-CM

## 2023-05-10 DIAGNOSIS — M47.817 LUMBOSACRAL SPONDYLOSIS WITHOUT MYELOPATHY: ICD-10-CM

## 2023-05-10 PROCEDURE — 1123F ACP DISCUSS/DSCN MKR DOCD: CPT | Performed by: PHYSICAL MEDICINE & REHABILITATION

## 2023-05-10 PROCEDURE — 99213 OFFICE O/P EST LOW 20 MIN: CPT | Performed by: PHYSICAL MEDICINE & REHABILITATION
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
## 2023-05-18 ENCOUNTER — TELEPHONE (OUTPATIENT)
Age: 68
End: 2023-05-18

## 2023-05-22 ENCOUNTER — TELEPHONE (OUTPATIENT)
Age: 68
End: 2023-05-22

## 2023-05-22 DIAGNOSIS — G50.0 TRIGEMINAL NEURALGIA: ICD-10-CM

## 2023-05-22 RX ORDER — CARBAMAZEPINE 200 MG/1
200 TABLET ORAL 3 TIMES DAILY
Qty: 90 TABLET | Refills: 0 | Status: SHIPPED | OUTPATIENT
Start: 2023-05-22 | End: 2023-06-21

## 2023-05-22 RX ORDER — GABAPENTIN 300 MG/1
300 CAPSULE ORAL 3 TIMES DAILY
Qty: 90 CAPSULE | Refills: 2 | Status: SHIPPED | OUTPATIENT
Start: 2023-05-22 | End: 2023-06-21

## 2023-05-22 NOTE — TELEPHONE ENCOUNTER
Patient daughter called and stated patient is having severe facial pain and would like a refill on her tegretol until she has her appointment. She is scheduled to see the surgeon on 5/30/23. Please advise.

## 2024-01-22 ENCOUNTER — TELEPHONE (OUTPATIENT)
Age: 69
End: 2024-01-22

## 2024-01-25 NOTE — TELEPHONE ENCOUNTER
Referral  Referral # [id number]  Referral Information    Referral # Creation Date Referral Status Status Update    [id number] 01/22/2024 In Progress 01/22/2024: Status History     Status Reason Referral Type Referral Reasons Referral Class   none Consult for Advice and Opinion none Incoming     To Specialty To Provider To Location/Place of Service To Department   Gastroenterology Joseph Flores MD none [facility] - GASTROENTEROLOGY     To Vendor Referred By By Location/Place of Service By Department   none Sebas Lai MD none none     Priority Start Date Expiration Date Referral Entered By   Routine 01/16/2024 01/15/2025 Bret Hood     Visits Requested Visits Authorized Visits Completed Visits Scheduled   2 2       Coverages    Payor Plan Auth. Required? Covered? Member # Authorized From Expires Auth # Precert. # Comment   [employer] MEDICARE COMMUNITY [facility] COMMUNITY PLAN MEDICARE -- Covered [id number] 1/1/2023 -- -- -- --     Referral Information    Referral # Creation Date Referral Status Status Update    [id number] 01/22/2024 In Progress 01/22/2024: Status History     Status Reason Referral Type Referral Reasons Referral Class   none Consult for Advice and Opinion none Incoming     To Specialty To Provider To Location/Place of Service To Department   Gastroenterology Joseph Flores MD none [facility] - GASTROENTEROLOGY     To Vendor Referred By By Location/Place of Service By Department   none Sebas Lai MD none none     Priority Start Date Expiration Date Referral Entered By   Routine 01/16/2024 01/15/2025 Bret Hood     Visits Requested Visits Authorized Visits Completed Visits Scheduled   2 2       Procedure Information    Service Details  Procedure Modifiers Provider Requested Approved   REF25 - AMB REFERRAL TO GASTROENTEROLOGY none Joseph Flores MD 2 2     Diagnosis Information    Diagnosis   K21.9 (ICD-10-CM) - Gastro-esophageal

## 2024-07-29 ENCOUNTER — OFFICE VISIT (OUTPATIENT)
Age: 69
End: 2024-07-29
Payer: MEDICARE

## 2024-07-29 VITALS
HEIGHT: 62 IN | BODY MASS INDEX: 29.44 KG/M2 | WEIGHT: 160 LBS | HEART RATE: 64 BPM | OXYGEN SATURATION: 99 % | TEMPERATURE: 97.7 F | SYSTOLIC BLOOD PRESSURE: 122 MMHG | RESPIRATION RATE: 16 BRPM | DIASTOLIC BLOOD PRESSURE: 66 MMHG

## 2024-07-29 DIAGNOSIS — K64.2 GRADE III HEMORRHOIDS: Primary | ICD-10-CM

## 2024-07-29 PROCEDURE — 1036F TOBACCO NON-USER: CPT | Performed by: COLON & RECTAL SURGERY

## 2024-07-29 PROCEDURE — 1123F ACP DISCUSS/DSCN MKR DOCD: CPT | Performed by: COLON & RECTAL SURGERY

## 2024-07-29 PROCEDURE — G8419 CALC BMI OUT NRM PARAM NOF/U: HCPCS | Performed by: COLON & RECTAL SURGERY

## 2024-07-29 PROCEDURE — 99203 OFFICE O/P NEW LOW 30 MIN: CPT | Performed by: COLON & RECTAL SURGERY

## 2024-07-29 PROCEDURE — 46600 DIAGNOSTIC ANOSCOPY SPX: CPT | Performed by: COLON & RECTAL SURGERY

## 2024-07-29 PROCEDURE — G8428 CUR MEDS NOT DOCUMENT: HCPCS | Performed by: COLON & RECTAL SURGERY

## 2024-07-29 PROCEDURE — G8400 PT W/DXA NO RESULTS DOC: HCPCS | Performed by: COLON & RECTAL SURGERY

## 2024-07-29 PROCEDURE — 1090F PRES/ABSN URINE INCON ASSESS: CPT | Performed by: COLON & RECTAL SURGERY

## 2024-07-29 PROCEDURE — 3017F COLORECTAL CA SCREEN DOC REV: CPT | Performed by: COLON & RECTAL SURGERY

## 2024-07-29 NOTE — PATIENT INSTRUCTIONS
Citrucel fiber powder once a day, try this for 1-2 months  Follow up in office if you would like to discuss hemorrhoid surgery

## 2024-07-29 NOTE — PROGRESS NOTES
no mass  Anoscopy: Large internal hemorrhoid left lateral and right posterior regions    No results found for: \"WBC\", \"HGB\", \"HCT\", \"MCV\", \"PLT\"  Lab Results   Component Value Date/Time    CREATININE 0.8 06/11/2024 11:43 AM      No results found for: \"MG\"  No results found for: \"CALCIUM\", \"PHOS\"    Assessment / Plan    Grade 3 hemorrhoids  Very mild symptoms  Citrucel daily  Hemorrhoidectomy if symptoms persist    The diagnoses and plan were discussed with the patient. All questions answered.  Plan of care agreed to by all concerned.

## 2024-10-07 ENCOUNTER — OFFICE VISIT (OUTPATIENT)
Age: 69
End: 2024-10-07
Payer: MEDICARE

## 2024-10-07 VITALS
OXYGEN SATURATION: 100 % | HEART RATE: 80 BPM | BODY MASS INDEX: 29.08 KG/M2 | WEIGHT: 158 LBS | TEMPERATURE: 98 F | HEIGHT: 62 IN

## 2024-10-07 DIAGNOSIS — M43.16 SPONDYLOLISTHESIS OF LUMBAR REGION: ICD-10-CM

## 2024-10-07 DIAGNOSIS — M54.16 LUMBAR NEURITIS: Primary | ICD-10-CM

## 2024-10-07 DIAGNOSIS — M47.817 LUMBOSACRAL SPONDYLOSIS WITHOUT MYELOPATHY: ICD-10-CM

## 2024-10-07 PROCEDURE — G8400 PT W/DXA NO RESULTS DOC: HCPCS | Performed by: PHYSICAL MEDICINE & REHABILITATION

## 2024-10-07 PROCEDURE — 3017F COLORECTAL CA SCREEN DOC REV: CPT | Performed by: PHYSICAL MEDICINE & REHABILITATION

## 2024-10-07 PROCEDURE — 1123F ACP DISCUSS/DSCN MKR DOCD: CPT | Performed by: PHYSICAL MEDICINE & REHABILITATION

## 2024-10-07 PROCEDURE — 99214 OFFICE O/P EST MOD 30 MIN: CPT | Performed by: PHYSICAL MEDICINE & REHABILITATION

## 2024-10-07 PROCEDURE — G8419 CALC BMI OUT NRM PARAM NOF/U: HCPCS | Performed by: PHYSICAL MEDICINE & REHABILITATION

## 2024-10-07 PROCEDURE — G8427 DOCREV CUR MEDS BY ELIG CLIN: HCPCS | Performed by: PHYSICAL MEDICINE & REHABILITATION

## 2024-10-07 PROCEDURE — 1036F TOBACCO NON-USER: CPT | Performed by: PHYSICAL MEDICINE & REHABILITATION

## 2024-10-07 PROCEDURE — 1090F PRES/ABSN URINE INCON ASSESS: CPT | Performed by: PHYSICAL MEDICINE & REHABILITATION

## 2024-10-07 PROCEDURE — 72110 X-RAY EXAM L-2 SPINE 4/>VWS: CPT | Performed by: PHYSICAL MEDICINE & REHABILITATION

## 2024-10-07 PROCEDURE — G8484 FLU IMMUNIZE NO ADMIN: HCPCS | Performed by: PHYSICAL MEDICINE & REHABILITATION
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
## 2024-10-17 ENCOUNTER — HOSPITAL ENCOUNTER (OUTPATIENT)
Facility: HOSPITAL | Age: 69
Discharge: HOME | End: 2024-10-20
Attending: PHYSICAL MEDICINE & REHABILITATION
Payer: MEDICARE

## 2024-10-17 DIAGNOSIS — M47.817 LUMBOSACRAL SPONDYLOSIS WITHOUT MYELOPATHY: ICD-10-CM

## 2024-10-17 DIAGNOSIS — M54.16 LUMBAR NEURITIS: ICD-10-CM

## 2024-10-17 DIAGNOSIS — M43.16 SPONDYLOLISTHESIS OF LUMBAR REGION: ICD-10-CM

## 2024-10-17 PROCEDURE — 72148 MRI LUMBAR SPINE W/O DYE: CPT

## 2024-11-13 ENCOUNTER — OFFICE VISIT (OUTPATIENT)
Age: 69
End: 2024-11-13
Payer: MEDICAID

## 2024-11-13 VITALS — WEIGHT: 157 LBS | BODY MASS INDEX: 28.89 KG/M2 | TEMPERATURE: 97 F | HEIGHT: 62 IN

## 2024-11-13 DIAGNOSIS — M43.16 SPONDYLOLISTHESIS OF LUMBAR REGION: ICD-10-CM

## 2024-11-13 DIAGNOSIS — M47.817 LUMBOSACRAL SPONDYLOSIS WITHOUT MYELOPATHY: Primary | ICD-10-CM

## 2024-11-13 DIAGNOSIS — M47.816 LUMBAR SPONDYLOSIS: ICD-10-CM

## 2024-11-13 DIAGNOSIS — M47.819 FACET ARTHROPATHY: ICD-10-CM

## 2024-11-13 PROCEDURE — 99214 OFFICE O/P EST MOD 30 MIN: CPT | Performed by: PHYSICAL MEDICINE & REHABILITATION

## 2024-11-13 PROCEDURE — 1123F ACP DISCUSS/DSCN MKR DOCD: CPT | Performed by: PHYSICAL MEDICINE & REHABILITATION

## 2024-11-13 RX ORDER — GABAPENTIN 300 MG/1
300 CAPSULE ORAL 3 TIMES DAILY
Qty: 90 CAPSULE | Refills: 2 | Status: SHIPPED | OUTPATIENT
Start: 2024-11-13 | End: 2025-02-11
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
## 2024-11-20 ENCOUNTER — TELEPHONE (OUTPATIENT)
Age: 69
End: 2024-11-20

## 2024-11-20 NOTE — TELEPHONE ENCOUNTER
Patient left message on nurse line asking if her appointment with Dr. Black on 12/09 is a consultation or is he doing a procedure? If so, does she need to bring someone with her to the appointment? Patient can be reached at [phone].

## 2024-12-16 ENCOUNTER — OFFICE VISIT (OUTPATIENT)
Age: 69
End: 2024-12-16
Payer: MEDICARE

## 2024-12-16 ENCOUNTER — PREP FOR PROCEDURE (OUTPATIENT)
Age: 69
End: 2024-12-16

## 2024-12-16 VITALS
BODY MASS INDEX: 29.26 KG/M2 | OXYGEN SATURATION: 99 % | WEIGHT: 159 LBS | SYSTOLIC BLOOD PRESSURE: 124 MMHG | HEIGHT: 62 IN | DIASTOLIC BLOOD PRESSURE: 62 MMHG | TEMPERATURE: 97.5 F | HEART RATE: 72 BPM | RESPIRATION RATE: 17 BRPM

## 2024-12-16 DIAGNOSIS — K64.2 GRADE III HEMORRHOIDS: ICD-10-CM

## 2024-12-16 DIAGNOSIS — K64.2 GRADE III HEMORRHOIDS: Primary | ICD-10-CM

## 2024-12-16 PROCEDURE — 1123F ACP DISCUSS/DSCN MKR DOCD: CPT | Performed by: COLON & RECTAL SURGERY

## 2024-12-16 PROCEDURE — G8484 FLU IMMUNIZE NO ADMIN: HCPCS | Performed by: COLON & RECTAL SURGERY

## 2024-12-16 PROCEDURE — G8428 CUR MEDS NOT DOCUMENT: HCPCS | Performed by: COLON & RECTAL SURGERY

## 2024-12-16 PROCEDURE — 1090F PRES/ABSN URINE INCON ASSESS: CPT | Performed by: COLON & RECTAL SURGERY

## 2024-12-16 PROCEDURE — G8419 CALC BMI OUT NRM PARAM NOF/U: HCPCS | Performed by: COLON & RECTAL SURGERY

## 2024-12-16 PROCEDURE — 1036F TOBACCO NON-USER: CPT | Performed by: COLON & RECTAL SURGERY

## 2024-12-16 PROCEDURE — 3017F COLORECTAL CA SCREEN DOC REV: CPT | Performed by: COLON & RECTAL SURGERY

## 2024-12-16 PROCEDURE — 99214 OFFICE O/P EST MOD 30 MIN: CPT | Performed by: COLON & RECTAL SURGERY

## 2024-12-16 PROCEDURE — G8400 PT W/DXA NO RESULTS DOC: HCPCS | Performed by: COLON & RECTAL SURGERY

## 2024-12-16 PROCEDURE — 1126F AMNT PAIN NOTED NONE PRSNT: CPT | Performed by: COLON & RECTAL SURGERY

## 2024-12-16 NOTE — PROGRESS NOTES
Subjective: She continues to have discomfort with walking and sitting.  She did not try the Citrucel.    Past medical history and ROS were reviewed and unchanged.     Rectum: Prolapse right posterior quadrant hemorrhoid, digital exam with good tone and no mass    Assessment / Plan    Grade 3 hemorrhoids  She prefers hemorrhoidectomy and we will schedule this  She will try Citrucel in the interim    30 minutes was spent in patient care.      The diagnoses and plan were discussed with patient.  All questions answered.  Plan of care agreed to by all concerned.  
13-Nov-2017 12:37

## 2025-01-10 ENCOUNTER — TELEPHONE (OUTPATIENT)
Age: 70
End: 2025-01-10

## 2025-01-10 NOTE — TELEPHONE ENCOUNTER
Pt called and cancelled surgery. Pt stated that she still has a cough and wants to make sure it is gone before she has surgery. Pt stated that she will call back to reschedule when she is feeling better.

## 2025-03-05 ENCOUNTER — CLINICAL DOCUMENTATION (OUTPATIENT)
Age: 70
End: 2025-03-05

## 2025-03-05 ENCOUNTER — TELEPHONE (OUTPATIENT)
Age: 70
End: 2025-03-05

## 2025-03-05 NOTE — TELEPHONE ENCOUNTER
Due to excessive cancellations, Dr Naylor has discharged patient from practice. She was contacted and told this and that she would need to come in and sign a records release form to get her records.

## (undated) DEVICE — NDL SPNE MANAN 22GX6IN --

## (undated) DEVICE — (D)NDL SPNE 22GX15CM -- DISC BY MFR W/NO SUB

## (undated) DEVICE — MEDIA CONTRAST 10ML 200MG/ML 41%

## (undated) DEVICE — TRAY MYEL SFTY +

## (undated) DEVICE — SYR 10ML LUER LOK 1/5ML GRAD --

## (undated) DEVICE — BANDAGE ADH W0.75XL3IN UNIV WVN FAB NAT GEN USE STRP N ADH